# Patient Record
Sex: FEMALE | Race: WHITE | NOT HISPANIC OR LATINO | URBAN - METROPOLITAN AREA
[De-identification: names, ages, dates, MRNs, and addresses within clinical notes are randomized per-mention and may not be internally consistent; named-entity substitution may affect disease eponyms.]

---

## 2024-04-29 PROBLEM — Z00.00 ENCOUNTER FOR PREVENTIVE HEALTH EXAMINATION: Status: ACTIVE | Noted: 2024-04-29

## 2024-05-23 ENCOUNTER — OUTPATIENT (OUTPATIENT)
Dept: OUTPATIENT SERVICES | Facility: HOSPITAL | Age: 74
LOS: 1 days | End: 2024-05-23
Payer: MEDICARE

## 2024-05-23 ENCOUNTER — APPOINTMENT (OUTPATIENT)
Dept: NEUROLOGY | Facility: HOSPITAL | Age: 74
End: 2024-05-23
Payer: MEDICARE

## 2024-05-23 VITALS
HEIGHT: 64 IN | HEART RATE: 56 BPM | DIASTOLIC BLOOD PRESSURE: 78 MMHG | RESPIRATION RATE: 14 BRPM | SYSTOLIC BLOOD PRESSURE: 148 MMHG | OXYGEN SATURATION: 98 % | BODY MASS INDEX: 28.17 KG/M2 | WEIGHT: 165 LBS | TEMPERATURE: 97.2 F

## 2024-05-23 DIAGNOSIS — I69.141: ICD-10-CM

## 2024-05-23 DIAGNOSIS — Z82.49 FAMILY HISTORY OF ISCHEMIC HEART DISEASE AND OTHER DISEASES OF THE CIRCULATORY SYSTEM: ICD-10-CM

## 2024-05-23 DIAGNOSIS — Z83.438 FAMILY HISTORY OF OTHER DISORDER OF LIPOPROTEIN METABOLISM AND OTHER LIPIDEMIA: ICD-10-CM

## 2024-05-23 DIAGNOSIS — L89.310 PRESSURE ULCER OF RIGHT BUTTOCK, UNSTAGEABLE: ICD-10-CM

## 2024-05-23 DIAGNOSIS — Z86.73 PERSONAL HISTORY OF TRANSIENT ISCHEMIC ATTACK (TIA), AND CEREBRAL INFARCTION W/OUT RESIDUAL DEFICITS: ICD-10-CM

## 2024-05-23 PROCEDURE — 99205 OFFICE O/P NEW HI 60 MIN: CPT

## 2024-05-23 PROCEDURE — G0463: CPT

## 2024-05-23 RX ORDER — APIXABAN 5 MG/1
5 TABLET, FILM COATED ORAL
Refills: 0 | Status: ACTIVE | COMMUNITY

## 2024-05-23 RX ORDER — ATORVASTATIN CALCIUM 80 MG/1
80 TABLET, FILM COATED ORAL
Refills: 0 | Status: ACTIVE | COMMUNITY

## 2024-05-24 NOTE — PHYSICAL EXAM
[General Appearance - Alert] : alert [General Appearance - Well Nourished] : well nourished [Person] : oriented to person [Place] : oriented to place [Time] : oriented to time [Naming Objects] : no difficulty naming common objects [Cranial Nerves Optic (II)] : visual acuity intact bilaterally,  visual fields full to confrontation, pupils equal round and reactive to light [Cranial Nerves Oculomotor (III)] : extraocular motion intact [Cranial Nerves Trigeminal (V)] : facial sensation intact symmetrically [Cranial Nerves Facial (VII)] : face symmetrical [Cranial Nerves Vestibulocochlear (VIII)] : hearing was intact bilaterally [Cranial Nerves Glossopharyngeal (IX)] : tongue and palate midline [Cranial Nerves Accessory (XI - Cranial And Spinal)] : head turning and shoulder shrug symmetric [Cranial Nerves Hypoglossal (XII)] : there was no tongue deviation with protrusion [Motor Tone] : muscle tone was normal in all four extremities [Motor Handedness Right-Handed] : the patient is right hand dominant [Paresis Pronator Drift Right-Sided] : a right-sided pronator drift was present [Motor Strength Hips Right Weakness] : hip weakness was present [Motor Strength Hips Left Weakness] : hip weakness was present [3] : hip flexion 3/5 [+4] : knee extension +4/5 [5] : ankle plantar flexion 5/5 [Sensation Tactile Decrease] : light touch was intact [2+] : Ankle jerk left 2+ [Fluency] : fluency not intact [Motor Strength Shoulders Right Weakness] : normal shoulder strength [Motor Strength Upper Extremities Right] : normal arm strength [Hand Weakness Right] : normal hand  [Motor Strength Shoulders Left Weakness] : normal shoulder strength [Motor Strength Upper Extremities Left] : normal arm strength [Hand Weakness Left] : normal hand  [Plantar Reflex Right Only] : normal on the right [Plantar Reflex Left Only] : normal on the left [___] : absent on the right [___] : absent on the left
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
## 2024-05-24 NOTE — REVIEW OF SYSTEMS
[Memory Lapses or Loss] : memory loss [Difficulty with Language] : ~M difficulty with language [Arm Weakness] : arm weakness [Leg Weakness] : leg weakness [Inability to Walk] : inability to walk [Negative] : Constitutional [Facial Weakness] : no facial weakness [Abnormal Sensation] : no abnormal sensation

## 2024-05-24 NOTE — END OF VISIT
[] : Resident [FreeTextEntry3] : 74 yo woman with Afib, here to establish care.  She has motor aphasia (partial), mild right pronator drift.  We do not have record to review. Ask family or [facility] to fax us her record. continue eliquis, and statin unchange. stroke risk factor modifications.  to follow with PCP, cardiologist.  [Time Spent: ___ minutes] : I have spent [unfilled] minutes of time on the encounter.

## 2024-05-24 NOTE — DISCUSSION/SUMMARY
[FreeTextEntry1] : 73F with recent history of stroke unclear/hemorrhagic conversion of ischemic versus primary nontraumatic hemorrhagic, HTN, HLD and atrial fibillration (on apixaban) presenting to clinic to establish care.   IMPRESSION Expressive aphasia, R slight pronator drift secondary to stroke/hemorrhage on L sided of the brain   PLAN  CT head non con  Further stroke workup pending additional background information  Asked patient to fax health information from [facility] to establish timeline of patient's care  Continue Eliquis 5mg BID  Continue Atorvastatin 80mg Qhs  RTC 1 month   Seen at bedside with Dr. Steph Merrill

## 2024-05-29 DIAGNOSIS — Z86.73 PERSONAL HISTORY OF TRANSIENT ISCHEMIC ATTACK (TIA), AND CEREBRAL INFARCTION WITHOUT RESIDUAL DEFICITS: ICD-10-CM

## 2024-05-29 DIAGNOSIS — Z83.438 FAMILY HISTORY OF OTHER DISORDER OF LIPOPROTEIN METABOLISM AND OTHER LIPIDEMIA: ICD-10-CM

## 2024-05-29 DIAGNOSIS — Z82.49 FAMILY HISTORY OF ISCHEMIC HEART DISEASE AND OTHER DISEASES OF THE CIRCULATORY SYSTEM: ICD-10-CM

## 2024-06-20 ENCOUNTER — APPOINTMENT (OUTPATIENT)
Dept: NEUROLOGY | Facility: HOSPITAL | Age: 74
End: 2024-06-20

## 2024-06-20 DIAGNOSIS — I63.9 CEREBRAL INFARCTION, UNSPECIFIED: ICD-10-CM

## 2024-07-10 ENCOUNTER — APPOINTMENT (OUTPATIENT)
Dept: CT IMAGING | Facility: CLINIC | Age: 74
End: 2024-07-10
Payer: MEDICARE

## 2024-07-10 ENCOUNTER — OUTPATIENT (OUTPATIENT)
Dept: OUTPATIENT SERVICES | Facility: HOSPITAL | Age: 74
LOS: 1 days | End: 2024-07-10
Payer: MEDICARE

## 2024-07-10 DIAGNOSIS — I63.9 CEREBRAL INFARCTION, UNSPECIFIED: ICD-10-CM

## 2024-07-10 PROCEDURE — 70450 CT HEAD/BRAIN W/O DYE: CPT

## 2024-07-10 PROCEDURE — 70450 CT HEAD/BRAIN W/O DYE: CPT | Mod: 26,MH

## 2024-07-23 ENCOUNTER — APPOINTMENT (OUTPATIENT)
Dept: NEUROLOGY | Facility: HOSPITAL | Age: 74
End: 2024-07-23
Payer: MEDICARE

## 2024-07-23 VITALS
TEMPERATURE: 97 F | HEART RATE: 63 BPM | DIASTOLIC BLOOD PRESSURE: 82 MMHG | WEIGHT: 165 LBS | SYSTOLIC BLOOD PRESSURE: 136 MMHG | HEIGHT: 64 IN | BODY MASS INDEX: 28.17 KG/M2 | RESPIRATION RATE: 16 BRPM

## 2024-07-23 DIAGNOSIS — I63.9 CEREBRAL INFARCTION, UNSPECIFIED: ICD-10-CM

## 2024-07-23 PROCEDURE — 99212 OFFICE O/P EST SF 10 MIN: CPT

## 2024-07-23 NOTE — ASSESSMENT
[FreeTextEntry1] : 73F with recent history of stroke unclear/hemorrhagic conversion of ischemic versus primary nontraumatic hemorrhagic, HTN, HLD and atrial fibillration (on apixaban) presenting to clinic to establish care.  IMPRESSION Expressive aphasia, R slight pronator drift secondary to stroke/hemorrhage on L sided of the brain  PLAN CT head non con reviewed: old infarct PT/OT/psych referral provided Continue Eliquis 5mg BID Continue Atorvastatin 80mg Qhs RTC 6 months  Case discussed w/ Dr. Villeda.

## 2024-07-23 NOTE — END OF VISIT
[] : Resident [FreeTextEntry3] : Patient stable from last visit. Continues to take medications as prescribed. Family asking for more therapy, will send therapy referral. Additionally, family concerned that the patient's anxiety and depression is unchecked, will refer as well. [Time Spent: ___ minutes] : I have spent [unfilled] minutes of time on the encounter.

## 2024-07-23 NOTE — PHYSICAL EXAM
[FreeTextEntry1] : Orientation: oriented to person, oriented to place and oriented to time.   Language: Noted to have Broca's aphasia. fluency not intact, but no difficulty naming common objects.   Cranial Nerves: visual acuity intact bilaterally, visual fields full to confrontation, pupils equal round and reactive to light, extraocular motion intact, facial sensation intact symmetrically, face symmetrical, hearing was intact bilaterally, tongue and palate midline, head turning and shoulder shrug symmetric and there was no tongue deviation with protrusion.   Motor: muscle tone was normal in all four extremities.   Motor Strength:. the patient is right hand dominant. a right-sided pronator drift was present.  Right upper extremity: normal shoulder strength, normal arm strength, normal hand . Left upper extremity normal shoulder strength, normal arm strength, normal hand . Right lower extremity strength: hip weakness was present, hip flexion 3/5, knee flexion 3/5, knee extension 3/5, ankle dorsiflexion 4+/5, ankle plantar flexion 5/5. Left lower extremity strength: hip weakness was present, hip flexion 3/5, knee flexion +4/5, knee extension +4/5, ankle dorsiflexion 5/5, ankle plantar flexion 5/5.   Sensory exam: light touch was intact.   Deep tendon reflexes: Biceps right 2+. Biceps left 2+.  Triceps right 2+. Triceps left 2+.  Brachioradialis right 2+. Brachioradialis left 2+.  Patella right 2+. Patella left 2+.  Ankle jerk right 2+. Ankle jerk left 2+.   Plantar responses normal on the right, normal on the left.   Ankle Clonus absent on the right, absent on the left.
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
## 2024-08-16 RX ORDER — GUAIFENESIN 100 MG/5ML
10 LIQUID ORAL
Refills: 0 | DISCHARGE
Start: 2024-08-16

## 2024-08-16 RX ORDER — IPRATROPIUM BROMIDE AND ALBUTEROL SULFATE .5; 3 MG/3ML; MG/3ML
3 SOLUTION RESPIRATORY (INHALATION)
Refills: 0 | DISCHARGE
Start: 2024-08-16

## 2024-08-22 ENCOUNTER — INPATIENT (INPATIENT)
Facility: HOSPITAL | Age: 74
LOS: 4 days | Discharge: NURSING, REHAB OR LONG-TERM CARE | End: 2024-08-27
Attending: STUDENT IN AN ORGANIZED HEALTH CARE EDUCATION/TRAINING PROGRAM | Admitting: STUDENT IN AN ORGANIZED HEALTH CARE EDUCATION/TRAINING PROGRAM
Payer: MEDICARE

## 2024-08-22 VITALS
WEIGHT: 179.9 LBS | RESPIRATION RATE: 16 BRPM | TEMPERATURE: 99 F | HEIGHT: 64 IN | SYSTOLIC BLOOD PRESSURE: 143 MMHG | DIASTOLIC BLOOD PRESSURE: 81 MMHG | OXYGEN SATURATION: 96 % | HEART RATE: 85 BPM

## 2024-08-22 DIAGNOSIS — R79.89 OTHER SPECIFIED ABNORMAL FINDINGS OF BLOOD CHEMISTRY: ICD-10-CM

## 2024-08-22 DIAGNOSIS — R56.9 UNSPECIFIED CONVULSIONS: ICD-10-CM

## 2024-08-22 LAB
ALBUMIN SERPL ELPH-MCNC: 4 G/DL — SIGNIFICANT CHANGE UP (ref 3.3–5)
ALP SERPL-CCNC: 99 U/L — SIGNIFICANT CHANGE UP (ref 40–120)
ALT FLD-CCNC: 11 U/L — SIGNIFICANT CHANGE UP (ref 4–33)
ANION GAP SERPL CALC-SCNC: 13 MMOL/L — SIGNIFICANT CHANGE UP (ref 7–14)
AST SERPL-CCNC: 18 U/L — SIGNIFICANT CHANGE UP (ref 4–32)
BASOPHILS # BLD AUTO: 0.04 K/UL — SIGNIFICANT CHANGE UP (ref 0–0.2)
BASOPHILS NFR BLD AUTO: 0.4 % — SIGNIFICANT CHANGE UP (ref 0–2)
BILIRUB SERPL-MCNC: 0.3 MG/DL — SIGNIFICANT CHANGE UP (ref 0.2–1.2)
BUN SERPL-MCNC: 20 MG/DL — SIGNIFICANT CHANGE UP (ref 7–23)
CALCIUM SERPL-MCNC: 9.6 MG/DL — SIGNIFICANT CHANGE UP (ref 8.4–10.5)
CHLORIDE SERPL-SCNC: 103 MMOL/L — SIGNIFICANT CHANGE UP (ref 98–107)
CK MB BLD-MCNC: 6.3 % — HIGH (ref 0–2.5)
CK MB CFR SERPL CALC: 5.5 NG/ML — HIGH
CK SERPL-CCNC: 87 U/L — SIGNIFICANT CHANGE UP (ref 25–170)
CO2 SERPL-SCNC: 25 MMOL/L — SIGNIFICANT CHANGE UP (ref 22–31)
CREAT SERPL-MCNC: 0.77 MG/DL — SIGNIFICANT CHANGE UP (ref 0.5–1.3)
EGFR: 81 ML/MIN/1.73M2 — SIGNIFICANT CHANGE UP
EOSINOPHIL # BLD AUTO: 0.02 K/UL — SIGNIFICANT CHANGE UP (ref 0–0.5)
EOSINOPHIL NFR BLD AUTO: 0.2 % — SIGNIFICANT CHANGE UP (ref 0–6)
FLUAV AG NPH QL: SIGNIFICANT CHANGE UP
FLUBV AG NPH QL: SIGNIFICANT CHANGE UP
GLUCOSE SERPL-MCNC: 112 MG/DL — HIGH (ref 70–99)
HCT VFR BLD CALC: 35.7 % — SIGNIFICANT CHANGE UP (ref 34.5–45)
HGB BLD-MCNC: 11.6 G/DL — SIGNIFICANT CHANGE UP (ref 11.5–15.5)
IANC: 7.43 K/UL — HIGH (ref 1.8–7.4)
IMM GRANULOCYTES NFR BLD AUTO: 0.7 % — SIGNIFICANT CHANGE UP (ref 0–0.9)
LYMPHOCYTES # BLD AUTO: 1.73 K/UL — SIGNIFICANT CHANGE UP (ref 1–3.3)
LYMPHOCYTES # BLD AUTO: 18.1 % — SIGNIFICANT CHANGE UP (ref 13–44)
MAGNESIUM SERPL-MCNC: 2.3 MG/DL — SIGNIFICANT CHANGE UP (ref 1.6–2.6)
MCHC RBC-ENTMCNC: 29.5 PG — SIGNIFICANT CHANGE UP (ref 27–34)
MCHC RBC-ENTMCNC: 32.5 GM/DL — SIGNIFICANT CHANGE UP (ref 32–36)
MCV RBC AUTO: 90.8 FL — SIGNIFICANT CHANGE UP (ref 80–100)
MONOCYTES # BLD AUTO: 0.29 K/UL — SIGNIFICANT CHANGE UP (ref 0–0.9)
MONOCYTES NFR BLD AUTO: 3 % — SIGNIFICANT CHANGE UP (ref 2–14)
NEUTROPHILS # BLD AUTO: 7.43 K/UL — HIGH (ref 1.8–7.4)
NEUTROPHILS NFR BLD AUTO: 77.6 % — HIGH (ref 43–77)
NRBC # BLD: 0 /100 WBCS — SIGNIFICANT CHANGE UP (ref 0–0)
NRBC # FLD: 0 K/UL — SIGNIFICANT CHANGE UP (ref 0–0)
PHOSPHATE SERPL-MCNC: 3.7 MG/DL — SIGNIFICANT CHANGE UP (ref 2.5–4.5)
PLATELET # BLD AUTO: 382 K/UL — SIGNIFICANT CHANGE UP (ref 150–400)
POTASSIUM SERPL-MCNC: 4 MMOL/L — SIGNIFICANT CHANGE UP (ref 3.5–5.3)
POTASSIUM SERPL-SCNC: 4 MMOL/L — SIGNIFICANT CHANGE UP (ref 3.5–5.3)
PROT SERPL-MCNC: 8 G/DL — SIGNIFICANT CHANGE UP (ref 6–8.3)
RBC # BLD: 3.93 M/UL — SIGNIFICANT CHANGE UP (ref 3.8–5.2)
RBC # FLD: 13.5 % — SIGNIFICANT CHANGE UP (ref 10.3–14.5)
RSV RNA NPH QL NAA+NON-PROBE: SIGNIFICANT CHANGE UP
SARS-COV-2 RNA SPEC QL NAA+PROBE: SIGNIFICANT CHANGE UP
SODIUM SERPL-SCNC: 141 MMOL/L — SIGNIFICANT CHANGE UP (ref 135–145)
TROPONIN T, HIGH SENSITIVITY RESULT: 330 NG/L — CRITICAL HIGH
TROPONIN T, HIGH SENSITIVITY RESULT: 366 NG/L — CRITICAL HIGH
TSH SERPL-MCNC: 1.68 UIU/ML — SIGNIFICANT CHANGE UP (ref 0.27–4.2)
WBC # BLD: 9.58 K/UL — SIGNIFICANT CHANGE UP (ref 3.8–10.5)
WBC # FLD AUTO: 9.58 K/UL — SIGNIFICANT CHANGE UP (ref 3.8–10.5)

## 2024-08-22 PROCEDURE — 99291 CRITICAL CARE FIRST HOUR: CPT

## 2024-08-22 PROCEDURE — 70496 CT ANGIOGRAPHY HEAD: CPT | Mod: 26,MC

## 2024-08-22 PROCEDURE — 70498 CT ANGIOGRAPHY NECK: CPT | Mod: 26,MC

## 2024-08-22 PROCEDURE — 99223 1ST HOSP IP/OBS HIGH 75: CPT

## 2024-08-22 PROCEDURE — 71045 X-RAY EXAM CHEST 1 VIEW: CPT | Mod: 26

## 2024-08-22 PROCEDURE — 99222 1ST HOSP IP/OBS MODERATE 55: CPT | Mod: FS,GC

## 2024-08-22 RX ORDER — LEVETIRACETAM 1000 MG/1
2000 TABLET ORAL ONCE
Refills: 0 | Status: DISCONTINUED | OUTPATIENT
Start: 2024-08-22 | End: 2024-08-22

## 2024-08-22 RX ADMIN — LEVETIRACETAM 2000 MILLIGRAM(S): 1000 TABLET ORAL at 22:49

## 2024-08-22 NOTE — ED PROVIDER NOTE - OBJECTIVE STATEMENT
This is a 74 year old woman with past medical history of CVA (residual expressive aphasia and R sided lower extremity deficits) , HTN,  a fib on eliquis presenting today for possible seizure. Sister in the room provided most of the history. She was called by the nursing home for a presumed witnessed seizure at [place] around 11am. She had another seizure about an hour later which is when the decision to called EMS was made by the doctor. She had some confusion after each episode and tongue laceration. This is the first time this has happened. She did not have head trauma, but stayed seated in her wheelchair during the episodes. Each episode last about 1-2 minutes.  She is complaining of some left sided neck pain and headache. She denies recent illness/infection, surgery, no new medications. She denies any chest pain, shortness of breath, palpitations, abdominal pain.

## 2024-08-22 NOTE — CONSULT NOTE ADULT - ASSESSMENT
74y (1950) woman with a PMHx significant for CVA (residual expressive aphasia and R sided lower extremity deficits), HTN,  afib on eliquis presenting today for possible seizures. [facility] ED team [facility]. Presumed seizure occurred around 11am. She had another seizure about an hour later which is when the decision to called EMS. She had some confusion after each episode and b/l tongue laceration. This is the first time this has happened. She did not have head trauma, but stayed seated in her wheelchair during the episodes. Each episode last about 1-2 minutes.  She is complained of some left sided neck pain and headache. She denies recent illness/infection, surgery, no new medications. She reports her speech improved has regressed after the seizures. Neuro exam pertinent for expressive aphasia but still able to be understood, right hemiparesis worse in proximal right leg. CTH shows old left superior division MCA infarct, ex vacuo dilatation of the left frontal horn.     Impression: 2 transient seizure like episodes possible focal with secondary generalization due to chronic stroke    Plan  vEEG  keppra 2g load   start keppra 750mg bid in 6 hrs       Case discussed with Neurology Attending, Dr. Barnes 74y (1950) woman with a PMHx significant for CVA (residual expressive aphasia and R sided lower extremity deficits), HTN,  afib on eliquis presenting today for possible seizures. Per ED team sister spoke with [facility] staff who witnessed the events. Presumed seizure occurred around 11am. She had another seizure about an hour later which is when the decision to called EMS. She had some confusion after each episode and b/l tongue laceration. This is the first time this has happened. She did not have head trauma, but stayed seated in her wheelchair during the episodes. Each episode last about 1-2 minutes.  She is complained of some left sided neck pain and headache. She denies recent illness/infection, surgery, no new medications. She reports her speech improved has regressed after the seizures. Neuro exam pertinent for expressive aphasia but still able to be understood, right hemiparesis worse in proximal right leg. CTH shows old left superior division MCA infarct, ex vacuo dilatation of the left frontal horn.     Impression: 2 transient seizure like episodes possible focal with secondary generalization due to chronic stroke    Plan  vEEG  keppra 2g load   start keppra 750mg bid in 6 hrs   Admit to EMU      Case discussed with Neurology Attending, Dr. Barnes

## 2024-08-22 NOTE — H&P ADULT - NSHPSOCIALHISTORY_GEN_ALL_CORE
Currently at [facility] for long term care  Ambulatory with wheelchair  Denies tobacco, EtOH, or illicit substance use

## 2024-08-22 NOTE — H&P ADULT - NSHPLABSRESULTS_GEN_ALL_CORE
LABS and ADDITIONAL STUDIES:                        11.6   9.58  )-----------( 382      ( 22 Aug 2024 17:34 )             35.7     141  |  103  |  20  ----------------------------<  112<H>     08-22  4.0   |  25  |  0.77    Ca    9.6      22 Aug 2024 17:34  Phos  3.7     08-22  Mg     2.30     08-22    TPro  8.0  /  Alb  4.0  /  TBili  0.3  /  DBili  x   /  AST  18  /  ALT  11  /  AlkPhos  99  08-22    hs Troponin, T - 366 ng/L (08-22-24 @ 19:34)  hs Troponin, T - 330 ng/L (08-22-24 @ 17:34)  Creatine Kinase: 87 U/L (08.22.24 @ 19:34)  CKMB Units: 5.5 ng/mL/CPK Mass Assay %: 6.3 % (08.22.24 @ 19:34)     COVID/Flu/RSV - negative    < from: Xray Chest 1 View- PORTABLE-Urgent (08.22.24 @ 21:26) >    FINDINGS:    The heart size is normal in size.  The lungs are clear.  There is no pleural effusion. There is no pneumothorax.  No acute bony abnormality.    IMPRESSION:  Clear lungs.        ******PRELIMINARY REPORT******      < end of copied text > LABS and ADDITIONAL STUDIES:                        11.6   9.58  )-----------( 382      ( 22 Aug 2024 17:34 )             35.7     141  |  103  |  20  ----------------------------<  112<H>     08-22  4.0   |  25  |  0.77    Ca    9.6      22 Aug 2024 17:34  Phos  3.7     08-22  Mg     2.30     08-22    TPro  8.0  /  Alb  4.0  /  TBili  0.3  /  DBili  x   /  AST  18  /  ALT  11  /  AlkPhos  99  08-22    hs Troponin, T - 366 ng/L (08-22-24 @ 19:34)  hs Troponin, T - 330 ng/L (08-22-24 @ 17:34)  Creatine Kinase: 87 U/L (08.22.24 @ 19:34)  CKMB Units: 5.5 ng/mL/CPK Mass Assay %: 6.3 % (08.22.24 @ 19:34)     COVID/Flu/RSV - negative    < from: Xray Chest 1 View- PORTABLE-Urgent (08.22.24 @ 21:26) >  ******PRELIMINARY REPORT******    FINDINGS:  The heart size is normal in size.  The lungs are clear.  There is no pleural effusion. There is no pneumothorax.  No acute bony abnormality.    IMPRESSION:  Clear lungs.  < end of copied text >    < from: CT Angio Head w/ IV Cont (08.22.24 @ 19:33) >  FINDINGS:  Head CT: There is an old left superior division MCA infarct. Ex vacuo dilatation of the left frontal horn is seen.    There is no acute intracranial hemorrhage, mass effect, shift of midline. No herniation, hydrocephalus, abnormal extra-axial fluid collection.    Mucosal thickening and layering secretions are seen within the right sphenoid sinus. Otherwise, the paranasal sinuses and tympanomastoid cavities are clear. The calvarium appears intact. The orbits appear unremarkable.    CTA NECK: There is a standard anatomic configuration to the aortic arch. Atherosclerosis affects the arch and origins of the great vessels without associated stenosis.    The bilateral common carotid arteries appear unremarkable. Atherosclerotic plaque affects the bilateral carotid bulbs without significant stenosis. The cervical internal carotid arteries are patent extending to the skull base.    The origins of the bilateral vertebral arteries are normal. The bilateral vertebral arteries are patent.    CTA HEAD: Minor calcified plaques affect the bilateral carotid siphons without associated stenosis. Otherwise, the bilateral intracranial internal carotid, anterior, and middle cerebral arteries appear unremarkable.    The anterior and left posterior communicating arteries are seen. There is a persistent fetal origin of the right posterior cerebral artery. The right fetal PCA appears unremarkable.    The posterior circulation appears patent.    The intracranial venous structures appear unremarkable.    IMPRESSION:  Head CT:  1. Chronic left-sided superior division MCA territory infarct.  2. No acute intracranial hemorrhage.    CTA neck and head:  1. No large vessel occlusion or major stenosis.  --- End of Report ---  < end of copied text >    EKG - Sinus rhythm with 1st degree AV block and PACs, rate 81, , QTc 453, TWI V2, no significant ST-T wave changes, no prior EKG for comparison

## 2024-08-22 NOTE — H&P ADULT - PROBLEM SELECTOR PLAN 4
- c/w eliquis  - Monitor HR - Patient with recent URI with sore throat, nasal congestion, and cough, now improving  - Here no leukocytosis, no fever, Fluvid neg  - Monitor symptomatically for now

## 2024-08-22 NOTE — CONSULT NOTE ADULT - SUBJECTIVE AND OBJECTIVE BOX
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  Cardiology called on consult for elevated Troponin.  Patient with witnessed 2 episodes of seizure like activity lasting 1 minute each with post seizure lethargy and confusion with tongue laceration. Patient denies history of seizures.  Patient found to have elevated troponins with ECG showing ST depressions in anterior leads with Q Waves.  CKs negative.  Patient denies chest pain, SOB or diaphoresis.  she does not recall the name of her cardiologist and does not recall if she ever had a stress test or cardiac catheterization.  She deferred questions to her sister but sister ws not available by phone.  On assessment, patient A+OX3 with hesitant speech and stuttering due to expressive aphasia, SR 70s, /70, Temp 98.2F, SATing 97% on RA, lungs CTA, RR 14, no pedal edema, no JVD. Labs show no leukocytosis, no anemia, normal creatinine elevated troponin 330 uptrending to 366, CK 87 with CKMB 5.5.  CT Head chronic Left sided chronic MCA territory infarct.      Allergies    penicillin (Short breath)    Intolerances    	    MEDICATIONS:        levETIRAcetam   Injectable 2000 milliGRAM(s) IV Push once            PAST MEDICAL & SURGICAL HISTORY:      FAMILY HISTORY:      l    REVIEW OF SYSTEMS:    CONSTITUTIONAL: Seizures, aphasia, right sided lower extremity weakness  EYES/ENT: No visual changes;  No vertigo or throat pain   NECK: No pain or stiffness  RESPIRATORY: No cough, wheezing, hemoptysis; No shortness of breath  CARDIOVASCULAR: No chest pain or palpitations  GASTROINTESTINAL: No abdominal or epigastric pain. No nausea, vomiting, or hematemesis  GENITOURINARY: No dysuria, frequency or hematuria  NEUROLOGICAL: No numbness or weakness  SKIN: No itching, burning, rashes, or lesions   All other review of systems is negative unless indicated above.    PHYSICAL EXAM:  T(C): 36.8 (08-22-24 @ 16:18), Max: 37.2 (08-22-24 @ 15:13)  HR: 81 (08-22-24 @ 16:18) (81 - 85)  BP: 137/87 (08-22-24 @ 16:18) (137/87 - 143/81)  RR: 17 (08-22-24 @ 16:18) (16 - 17)  SpO2: 97% (08-22-24 @ 16:18) (96% - 97%)  Wt(kg): --  I&O's Summary      Appearance: expressive aphasia right sided lower extremity deficits  HEENT:  Normal oral mucosa, PERRL, EOMI	  Cardiovascular: Normal S1 S2, No JVD, No murmurs/rubs/gallops  Respiratory: Lungs clear to auscultation bilaterally  Gastrointestinal:  Soft, Non-tender, + BS	  Skin: No rashes, No ecchymoses, No cyanosis	  Neurologic: Non-focal  Extremities: No clubbing, cyanosis or edema  Vascular: Peripheral pulses palpable 2+ bilaterally  Psychiatry: A & O x 3 expressive aphasia    LABS:	 	    CBC Full  -  ( 22 Aug 2024 17:34 )  WBC Count : 9.58 K/uL  Hemoglobin : 11.6 g/dL  Hematocrit : 35.7 %  Platelet Count - Automated : 382 K/uL  Mean Cell Volume : 90.8 fL  Mean Cell Hemoglobin : 29.5 pg  Mean Cell Hemoglobin Concentration : 32.5 gm/dL  Auto Neutrophil # : 7.43 K/uL  Auto Lymphocyte # : 1.73 K/uL  Auto Monocyte # : 0.29 K/uL  Auto Eosinophil # : 0.02 K/uL  Auto Basophil # : 0.04 K/uL  Auto Neutrophil % : 77.6 %  Auto Lymphocyte % : 18.1 %  Auto Monocyte % : 3.0 %  Auto Eosinophil % : 0.2 %  Auto Basophil % : 0.4 %    08-22    141  |  103  |  20  ----------------------------<  112<H>  4.0   |  25  |  0.77    Ca    9.6      22 Aug 2024 17:34  Phos  3.7     08-22  Mg     2.30     08-22    TPro  8.0  /  Alb  4.0  /  TBili  0.3  /  DBili  x   /  AST  18  /  ALT  11  /  AlkPhos  99  08-22      proBNP:   Lipid Profile:   HgA1c:   TSH: Thyroid Stimulating Hormone, Serum: 1.68 uIU/mL (08-22 @ 17:34)        CARDIAC MARKERS:            TELEMETRY: 	    ECG:  	  RADIOLOGY:  < from: CT Angio Head w/ IV Cont (08.22.24 @ 19:33) >  ACC: [id number] EXAM:  CT ANGIO BRAIN (W)AW IC   ORDERED BY: JOSH TREVINO     ACC: [id number] EXAM:  CT ANGIO NECK (W)AW IC   ORDERED BY: JOSH TREVINO     PROCEDURE DATE:  08/22/2024          INTERPRETATION:  .    CLINICAL INFORMATION: New seizure. History of stroke.    TECHNIQUE: Transaxial CT images were acquired through the head without   the administration of IV contrast. Thin section CT angiography from the   aortic arch to the cranial vertex was also performed using a multislice   CT scanner,following the infusion of intravenous contrast material. 90   cc's of IV Omnipaque 350 was administered without immediate complication.   10 cc's was discarded. Sagittal and coronal MIP reformatted images were   obtained from the source data. Both the axial source and reconstructed   images were reviewed.    COMPARISON: Prior CT study of the head from 7/10/2024. No prior CT   angiogram studies of the head or neck are available for comparison.    FINDINGS:    Head CT: There is an old left superiordivision MCA infarct. Ex vacuo   dilatation of the left frontal horn is seen.    There is no acute intracranial hemorrhage, mass effect, shift of midline.   No herniation, hydrocephalus, abnormal extra-axial fluid collection.    Mucosal thickening and layering secretions are seen within the right   sphenoid sinus. Otherwise, the paranasal sinuses and tympanomastoid   cavities are clear. The calvarium appears intact. The orbits appear   unremarkable.    CTA NECK: There is a standard anatomic configuration to the aortic arch.   Atherosclerosis affects the arch and origins of the great vessels without   associated stenosis.    The bilateral common carotid arteries appear unremarkable.   Atherosclerotic plaque affects the bilateral carotid bulbs without   significant stenosis. The cervical internal carotid arteries are patent   extending to the skull base.    The origins of the bilateral vertebral arteries are normal. The bilateral   vertebral arteries are patent.    CTA HEAD: Minor calcifiedplaques affect the bilateral carotid siphons   without associated stenosis. Otherwise, the bilateral intracranial   internal carotid, anterior, and middle cerebral arteries appear   unremarkable.    The anterior and left posterior communicating arteries are seen. There is   a persistent fetal origin of the right posterior cerebral artery. The   right fetal PCA appears unremarkable.    The posterior circulation appears patent.    The intracranial venous structures appear unremarkable.    IMPRESSION:    Head CT:  1. Chronic left-sided superior division MCA territory infarct.  2. No acute intracranial hemorrhage.    CTA neck and head:  1. No large vessel occlusion or major stenosis.    --- End of Report ---            SOPHIA MICHAUD MD; Attending Radiologist  This document has been electronically signed. 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  Cardiology called on consult for elevated Troponin.  Patient with witnessed 2 episodes of seizure like activity lasting 1 minute each with post seizure lethargy and confusion with tongue laceration. Patient denies history of seizures.  Patient found to have elevated troponins with ECG showing T wave inversions in anterior leads with Q Waves.  CKs negative.  Patient denies chest pain, SOB or diaphoresis.  she does not recall the name of her cardiologist and does not recall if she ever had a stress test or cardiac catheterization.  She deferred questions to her sister but sister ws not available by phone.  On assessment, patient A+OX3 with hesitant speech and stuttering due to expressive aphasia, SR 70s, /70, Temp 98.2F, SATing 97% on RA, lungs CTA, RR 14, no pedal edema, no JVD. Labs show no leukocytosis, no anemia, normal creatinine elevated troponin 330 uptrending to 366, CK 87 with CKMB 5.5.  CT Head chronic Left sided chronic MCA territory infarct.      Allergies    penicillin (Short breath)    Intolerances    	    MEDICATIONS:        levETIRAcetam   Injectable 2000 milliGRAM(s) IV Push once            PAST MEDICAL & SURGICAL HISTORY:      FAMILY HISTORY:      l    REVIEW OF SYSTEMS:    CONSTITUTIONAL: Seizures, aphasia, right sided lower extremity weakness  EYES/ENT: No visual changes;  No vertigo or throat pain   NECK: No pain or stiffness  RESPIRATORY: No cough, wheezing, hemoptysis; No shortness of breath  CARDIOVASCULAR: No chest pain or palpitations  GASTROINTESTINAL: No abdominal or epigastric pain. No nausea, vomiting, or hematemesis  GENITOURINARY: No dysuria, frequency or hematuria  NEUROLOGICAL: No numbness or weakness  SKIN: No itching, burning, rashes, or lesions   All other review of systems is negative unless indicated above.    PHYSICAL EXAM:  T(C): 36.8 (08-22-24 @ 16:18), Max: 37.2 (08-22-24 @ 15:13)  HR: 81 (08-22-24 @ 16:18) (81 - 85)  BP: 137/87 (08-22-24 @ 16:18) (137/87 - 143/81)  RR: 17 (08-22-24 @ 16:18) (16 - 17)  SpO2: 97% (08-22-24 @ 16:18) (96% - 97%)  Wt(kg): --  I&O's Summary      Appearance: expressive aphasia right sided lower extremity deficits  HEENT:  Normal oral mucosa, PERRL, EOMI	  Cardiovascular: Normal S1 S2, No JVD, No murmurs/rubs/gallops  Respiratory: Lungs clear to auscultation bilaterally  Gastrointestinal:  Soft, Non-tender, + BS	  Skin: No rashes, No ecchymoses, No cyanosis	  Neurologic: Non-focal  Extremities: No clubbing, cyanosis or edema  Vascular: Peripheral pulses palpable 2+ bilaterally  Psychiatry: A & O x 3 expressive aphasia    LABS:	 	    CBC Full  -  ( 22 Aug 2024 17:34 )  WBC Count : 9.58 K/uL  Hemoglobin : 11.6 g/dL  Hematocrit : 35.7 %  Platelet Count - Automated : 382 K/uL  Mean Cell Volume : 90.8 fL  Mean Cell Hemoglobin : 29.5 pg  Mean Cell Hemoglobin Concentration : 32.5 gm/dL  Auto Neutrophil # : 7.43 K/uL  Auto Lymphocyte # : 1.73 K/uL  Auto Monocyte # : 0.29 K/uL  Auto Eosinophil # : 0.02 K/uL  Auto Basophil # : 0.04 K/uL  Auto Neutrophil % : 77.6 %  Auto Lymphocyte % : 18.1 %  Auto Monocyte % : 3.0 %  Auto Eosinophil % : 0.2 %  Auto Basophil % : 0.4 %    08-22    141  |  103  |  20  ----------------------------<  112<H>  4.0   |  25  |  0.77    Ca    9.6      22 Aug 2024 17:34  Phos  3.7     08-22  Mg     2.30     08-22    TPro  8.0  /  Alb  4.0  /  TBili  0.3  /  DBili  x   /  AST  18  /  ALT  11  /  AlkPhos  99  08-22      proBNP:   Lipid Profile:   HgA1c:   TSH: Thyroid Stimulating Hormone, Serum: 1.68 uIU/mL (08-22 @ 17:34)        CARDIAC MARKERS:            TELEMETRY: 	    ECG:  	  RADIOLOGY:  < from: CT Angio Head w/ IV Cont (08.22.24 @ 19:33) >  ACC: [id number] EXAM:  CT ANGIO BRAIN (W)AW IC   ORDERED BY: JOSH TREVINO     ACC: [id number] EXAM:  CT ANGIO NECK (W)AW IC   ORDERED BY: JOSH TREVINO     PROCEDURE DATE:  08/22/2024          INTERPRETATION:  .    CLINICAL INFORMATION: New seizure. History of stroke.    TECHNIQUE: Transaxial CT images were acquired through the head without   the administration of IV contrast. Thin section CT angiography from the   aortic arch to the cranial vertex was also performed using a multislice   CT scanner,following the infusion of intravenous contrast material. 90   cc's of IV Omnipaque 350 was administered without immediate complication.   10 cc's was discarded. Sagittal and coronal MIP reformatted images were   obtained from the source data. Both the axial source and reconstructed   images were reviewed.    COMPARISON: Prior CT study of the head from 7/10/2024. No prior CT   angiogram studies of the head or neck are available for comparison.    FINDINGS:    Head CT: There is an old left superiordivision MCA infarct. Ex vacuo   dilatation of the left frontal horn is seen.    There is no acute intracranial hemorrhage, mass effect, shift of midline.   No herniation, hydrocephalus, abnormal extra-axial fluid collection.    Mucosal thickening and layering secretions are seen within the right   sphenoid sinus. Otherwise, the paranasal sinuses and tympanomastoid   cavities are clear. The calvarium appears intact. The orbits appear   unremarkable.    CTA NECK: There is a standard anatomic configuration to the aortic arch.   Atherosclerosis affects the arch and origins of the great vessels without   associated stenosis.    The bilateral common carotid arteries appear unremarkable.   Atherosclerotic plaque affects the bilateral carotid bulbs without   significant stenosis. The cervical internal carotid arteries are patent   extending to the skull base.    The origins of the bilateral vertebral arteries are normal. The bilateral   vertebral arteries are patent.    CTA HEAD: Minor calcifiedplaques affect the bilateral carotid siphons   without associated stenosis. Otherwise, the bilateral intracranial   internal carotid, anterior, and middle cerebral arteries appear   unremarkable.    The anterior and left posterior communicating arteries are seen. There is   a persistent fetal origin of the right posterior cerebral artery. The   right fetal PCA appears unremarkable.    The posterior circulation appears patent.    The intracranial venous structures appear unremarkable.    IMPRESSION:    Head CT:  1. Chronic left-sided superior division MCA territory infarct.  2. No acute intracranial hemorrhage.    CTA neck and head:  1. No large vessel occlusion or major stenosis.    --- End of Report ---            SOPHIA MICHAUD MD; Attending Radiologist  This document has been electronically signed. Aug 22 2024  7:50PM    < end of copied text >    OTHER: 	    PREVIOUS DIAGNOSTIC TESTING:    [ ] Echocardiogram:  [ ] Catheterization:  [ ] Stress Test:

## 2024-08-22 NOTE — H&P ADULT - PROBLEM SELECTOR PLAN 2
- Patient with elevated troponin with uptrend on repeat but CK/CKMB not significant  - Patient reported having midsternal chest pain earlier in the day but none currently, no palpitations or dizziness/LOC currently  - EKG with TWI in V1-V2 but no significant findings of ischemia  - Spoke with cardiology, recommends to trend CE, labs ordered as per cards, repeat EKG in AM  - f/u cards reccs in AM - Patient with elevated troponin with uptrend on repeat but CK/CKMB not significant  - Patient reported having midsternal chest pain earlier in the day but none currently, no palpitations or dizziness/LOC currently  - EKG with TWI in V1-V2 but no significant findings of ischemia  - Spoke with cardiology, recommends to trend CE, labs ordered as per cards, repeat EKG in AM, TTE ordered  - f/u cards reccs in AM  - Of note, patient noted to have a CAYDEN loudest at the LUSB, she is not sure if she has had a murmur in the past or not, said her sister would know better -> f/u TTE, clarify with sister in AM

## 2024-08-22 NOTE — H&P ADULT - PROBLEM SELECTOR PLAN 9
DVT ppx - On eliquis  Diet - Pending dysphagia screen, if passes then likely soft and bite sized pending dysphagia screen  Activity - OOB with assistance, increase as tolerated    Fall and aspiration precautions

## 2024-08-22 NOTE — ED PROVIDER NOTE - PROGRESS NOTE DETAILS
URIEL:  Cardiology consulted for elevated troponin.  To see patient. [place] PGY3:  Spoke with Neurology who will come and see the Pt. They recommend ordering video EEG. Spoke with Neurology who want to load the Pt with 2g keppra and admit to Dr. Barnes for video EEG.

## 2024-08-22 NOTE — CONSULT NOTE ADULT - SUBJECTIVE AND OBJECTIVE BOX
Neurology - Consult Note    -  Spectra: [phone] ([facility]), [phone] ([facility])  -    HPI: Patient LAILA JARVIS is a 74y (1950) woman with a PMHx significant for CVA (residual expressive aphasia and R sided lower extremity deficits) , HTN,  a fib on eliquis presenting today for possible seizure. Sister in the room provided most of the history. She was called by the nursing home for a presumed witnessed seizure at [place] around 11am. She had another seizure about an hour later which is when the decision to called EMS was made by the doctor. She had some confusion after each episode and tongue laceration. This is the first time this has happened. She did not have head trauma, but stayed seated in her wheelchair during the episodes. Each episode last about 1-2 minutes.  She is complaining of some left sided neck pain and headache. She denies recent illness/infection, surgery, no new medications.    ED vitals 143/81, HR 81, afebrile, O2 96%  on RA    Review of Systems: refer to HPI     Allergies: penicillin (Short breath)    PMHx/PSHx/Family Hx: As above, otherwise see below     Social Hx: No current use of tobacco, alcohol, or illicit drugs    Medications:  MEDICATIONS  (STANDING):  MEDICATIONS  (PRN):    Vitals:  T(C): 36.8 (08-22-24 @ 16:18), Max: 37.2 (08-22-24 @ 15:13)  HR: 81 (08-22-24 @ 16:18) (81 - 85)  BP: 137/87 (08-22-24 @ 16:18) (137/87 - 143/81)  RR: 17 (08-22-24 @ 16:18) (16 - 17)  SpO2: 97% (08-22-24 @ 16:18) (96% - 97%)    Physical Examination: INCOMPLETE  General - NAD    Neurologic Exam:  Mental status - Awake, Alert, Oriented to person, place, and time. Speech fluent, repetition and naming intact. Follows simple and complex commands. Attention/concentration, recent and remote memory (including registration and recall), and fund of knowledge intact    Cranial nerves - PERRLA, VFF, EOMI, face sensation (V1-V3) intact b/l, facial strength intact without asymmetry b/l, hearing intact b/l, palate with symmetric elevation, trapezius OR sternocleidomastiod 5/5 strength b/l, tongue midline on protrusion with full lateral movement    Motor - Normal bulk and tone throughout. No pronator drift.  Strength testing            Deltoid      Biceps      Triceps     Wrist Extension    Wrist Flexion     Interossei         R            5                 5               5                     5                              5                        5                 5  L             5                 5               5                     5                              5                        5                 5              Hip Flexion    Hip Extension    Knee Flexion    Knee Extension    Dorsiflexion    Plantar Flexion  R              5                           5                       5                           5                            5                          5  L              5                           5                        5                           5                            5                          5    Sensation - Light touch/temperature OR pain/vibration intact throughout    DTR's -             Biceps      Triceps     Brachioradialis      Patellar    Ankle    Toes/plantar response  R             2+             2+                  2+                       2+            2+                 Down  L              2+             2+                 2+                        2+           2+                 Down    Coordination - Finger to Nose intact b/l. No tremors appreciated    Gait and station - Normal casual gait. Romberg (-)    Labs:                        11.6   9.58  )-----------( 382      ( 22 Aug 2024 17:34 )             35.7     08-22    141  |  103  |  20  ----------------------------<  112<H>  4.0   |  25  |  0.77    Ca    9.6      22 Aug 2024 17:34  Phos  3.7     08-22  Mg     2.30     08-22    TPro  8.0  /  Alb  4.0  /  TBili  0.3  /  DBili  x   /  AST  18  /  ALT  11  /  AlkPhos  99  08-22    CAPILLARY BLOOD GLUCOSE      POCT Blood Glucose.: 131 mg/dL (22 Aug 2024 15:25)    LIVER FUNCTIONS - ( 22 Aug 2024 17:34 )  Alb: 4.0 g/dL / Pro: 8.0 g/dL / ALK PHOS: 99 U/L / ALT: 11 U/L / AST: 18 U/L / GGT: x             Radiology:  Head CT: There is an old left superior division MCA infarct. Ex vacuo dilatation of the left frontal horn is seen.  There is no acute intracranial hemorrhage, mass effect, shift of midline. No herniation, hydrocephalus, abnormal extra-axial fluid collection.    CTA NECK: There is a standard anatomic configuration to the aortic arch. Atherosclerosis affects the arch and origins of the great vessels without associated stenosis.  The bilateral common carotid arteries appear unremarkable.   Atherosclerotic plaque affects the bilateral carotid bulbs without significant stenosis.   The cervical internal carotid arteries are patent extending to the skull base.  The origins of the bilateral vertebral arteries are normal.  The bilateral vertebral arteries are patent.    CTA HEAD: Minor calcified plaques affect the bilateral carotid siphons without associated stenosis.   Otherwise, the bilateral intracranial internal carotid, anterior, and middle cerebral arteries appear unremarkable.  The anterior and left posterior communicating arteries are seen. T  here is a persistent fetal origin of the right posterior cerebral artery. The right fetal PCA appears unremarkable.  The posterior circulation appears patent.  The intracranial venous structures appear unremarkable.    IMPRESSION:  Head CT:  1. Chronic left-sided superior division MCA territory infarct.  2. No acute intracranial hemorrhage.  CTA neck and head:  1. No large vessel occlusion or major stenosis.     Neurology - Consult Note    -  Spectra: [phone] ([facility]), [phone] ([facility])  -    HPI: Patient LAILA JARVIS is a 74y (1950) woman with a PMHx significant for CVA (residual expressive aphasia and R sided lower extremity deficits), HTN,  afib on eliquis presenting today for possible seizures. [facility] ED [facility]. Presumed seizure occurred around 11am. She had another seizure about an hour later which is when the decision to called EMS. She had some confusion after each episode and b/l tongue laceration. This is the first time this has happened. She did not have head trauma, but stayed seated in her wheelchair during the episodes. Each episode last about 1-2 minutes.  She is complained of some left sided neck pain and headache. She denies recent illness/infection, surgery, no new medications. She reports her speech improved has regressed after the seizures.     ED vitals 143/81, HR 81, afebrile, O2 96%  on RA    Review of Systems: refer to HPI     Allergies: penicillin (Short breath)    PMHx/PSHx/Family Hx: As above, otherwise see below     Social Hx: No current use of tobacco, alcohol, or illicit drugs    Medications:  MEDICATIONS  (STANDING):  MEDICATIONS  (PRN):    Vitals:  T(C): 36.8 (08-22-24 @ 16:18), Max: 37.2 (08-22-24 @ 15:13)  HR: 81 (08-22-24 @ 16:18) (81 - 85)  BP: 137/87 (08-22-24 @ 16:18) (137/87 - 143/81)  RR: 17 (08-22-24 @ 16:18) (16 - 17)  SpO2: 97% (08-22-24 @ 16:18) (96% - 97%)    Physical Examination:  General - NAD    Neurologic Exam:  Mental status - Awake, Alert, Oriented to self, location (hospital), time (august 2024)  Speech is not fluent, she can repeat and intermittently identify objects  Follows simple  commands.    Cranial nerves - PERRLA, VFF, EOMI, face sensation (V1-V3) intact b/l, facial strength intact without asymmetry b/l, hearing intact b/l, palate with symmetric elevation, trapezius 5/5 strength b/l, tongue midline on protrusion with full lateral movement    Motor - Normal bulk and tone throughout. No pronator drift.  Strength testing            Deltoid      Biceps      Triceps          R            5                 5               5                     5                                L             5                 5               5                     4+                                       Hip Flexion       Knee Flexion    Knee Extension    Dorsiflexion    Plantar Flexion  R              3                           4-                       4-                      4+                            4+                           L              5                           5                        5                           5                            5                              Sensation - Light touch intact throughout    DTR's -             Biceps      Triceps     Brachioradialis      Patellar    Ankle    Toes/plantar response  R             2+             2+                  2+                       2+            2+                 mute  L              2+             2+                 2+                        2+           2+                 mute    Coordination - Finger to Nose intact b/l. No tremors appreciated    Gait and station - deferred due to weakness    Labs:                        11.6   9.58  )-----------( 382      ( 22 Aug 2024 17:34 )             35.7     08-22    141  |  103  |  20  ----------------------------<  112<H>  4.0   |  25  |  0.77    Ca    9.6      22 Aug 2024 17:34  Phos  3.7     08-22  Mg     2.30     08-22    TPro  8.0  /  Alb  4.0  /  TBili  0.3  /  DBili  x   /  AST  18  /  ALT  11  /  AlkPhos  99  08-22    CAPILLARY BLOOD GLUCOSE      POCT Blood Glucose.: 131 mg/dL (22 Aug 2024 15:25)    LIVER FUNCTIONS - ( 22 Aug 2024 17:34 )  Alb: 4.0 g/dL / Pro: 8.0 g/dL / ALK PHOS: 99 U/L / ALT: 11 U/L / AST: 18 U/L / GGT: x             Radiology:  Head CT: There is an old left superior division MCA infarct. Ex vacuo dilatation of the left frontal horn is seen.  There is no acute intracranial hemorrhage, mass effect, shift of midline. No herniation, hydrocephalus, abnormal extra-axial fluid collection.    CTA NECK: There is a standard anatomic configuration to the aortic arch. Atherosclerosis affects the arch and origins of the great vessels without associated stenosis.  The bilateral common carotid arteries appear unremarkable.   Atherosclerotic plaque affects the bilateral carotid bulbs without significant stenosis.   The cervical internal carotid arteries are patent extending to the skull base.  The origins of the bilateral vertebral arteries are normal.  The bilateral vertebral arteries are patent.    CTA HEAD: Minor calcified plaques affect the bilateral carotid siphons without associated stenosis.   Otherwise, the bilateral intracranial internal carotid, anterior, and middle cerebral arteries appear unremarkable.  The anterior and left posterior communicating arteries are seen. T  here is a persistent fetal origin of the right posterior cerebral artery. The right fetal PCA appears unremarkable.  The posterior circulation appears patent.  The intracranial venous structures appear unremarkable.    IMPRESSION:  Head CT:  1. Chronic left-sided superior division MCA territory infarct.  2. No acute intracranial hemorrhage.  CTA neck and head:  1. No large vessel occlusion or major stenosis.     Neurology - Consult Note    -  Spectra: [phone] ([facility]), [phone] ([facility])  -    HPI: Patient LAILA JARVIS is a 74y (1950) woman with a PMHx significant for CVA (residual expressive aphasia and R sided lower extremity deficits), HTN,  afib on eliquis presenting today for possible seizures. [facility]. Presumed seizure occurred around 11am. She had another seizure about an hour later which is when the decision to called EMS. She had some confusion after each episode and b/l tongue laceration. This is the first time this has happened. She did not have head trauma, but stayed seated in her wheelchair during the episodes. Each episode last about 1-2 minutes.  She is complained of some left sided neck pain and headache. She denies recent illness/infection, surgery, no new medications. She reports her speech improved has regressed after the seizures.     ED vitals 143/81, HR 81, afebrile, O2 96%  on RA    Review of Systems: refer to HPI     Allergies: penicillin (Short breath)    PMHx/PSHx/Family Hx: As above, otherwise see below     Social Hx: No current use of tobacco, alcohol, or illicit drugs    Medications:  MEDICATIONS  (STANDING):  MEDICATIONS  (PRN):    Vitals:  T(C): 36.8 (08-22-24 @ 16:18), Max: 37.2 (08-22-24 @ 15:13)  HR: 81 (08-22-24 @ 16:18) (81 - 85)  BP: 137/87 (08-22-24 @ 16:18) (137/87 - 143/81)  RR: 17 (08-22-24 @ 16:18) (16 - 17)  SpO2: 97% (08-22-24 @ 16:18) (96% - 97%)    Physical Examination:  General - NAD    Neurologic Exam:  Mental status - Awake, Alert, Oriented to self, location (hospital), time (august 2024)  Speech is not fluent, she can repeat and intermittently identify objects  Follows simple  commands.    Cranial nerves - PERRLA, VFF, EOMI, face sensation (V1-V3) intact b/l, facial strength intact without asymmetry b/l, hearing intact b/l, palate with symmetric elevation, trapezius 5/5 strength b/l, tongue midline on protrusion with full lateral movement    Motor - Normal bulk and tone throughout. No pronator drift.  Strength testing            Deltoid      Biceps      Triceps          R            5                 5               5                     4+                                L             5                 5               5                     5                                       Hip Flexion       Knee Flexion    Knee Extension    Dorsiflexion    Plantar Flexion  R              3                           4-                       4-                      4+                            4+                           L              5                           5                        5                           5                            5                              Sensation - Light touch intact throughout    DTR's -             Biceps      Triceps     Brachioradialis      Patellar    Ankle    Toes/plantar response  R             2+             2+                  2+                       2+            2+                 mute  L              2+             2+                 2+                        2+           2+                 mute    Coordination - Finger to Nose intact b/l. No tremors appreciated    Gait and station - deferred due to weakness    Labs:                        11.6   9.58  )-----------( 382      ( 22 Aug 2024 17:34 )             35.7     08-22    141  |  103  |  20  ----------------------------<  112<H>  4.0   |  25  |  0.77    Ca    9.6      22 Aug 2024 17:34  Phos  3.7     08-22  Mg     2.30     08-22    TPro  8.0  /  Alb  4.0  /  TBili  0.3  /  DBili  x   /  AST  18  /  ALT  11  /  AlkPhos  99  08-22    CAPILLARY BLOOD GLUCOSE      POCT Blood Glucose.: 131 mg/dL (22 Aug 2024 15:25)    LIVER FUNCTIONS - ( 22 Aug 2024 17:34 )  Alb: 4.0 g/dL / Pro: 8.0 g/dL / ALK PHOS: 99 U/L / ALT: 11 U/L / AST: 18 U/L / GGT: x             Radiology:  Head CT: There is an old left superior division MCA infarct. Ex vacuo dilatation of the left frontal horn is seen.  There is no acute intracranial hemorrhage, mass effect, shift of midline. No herniation, hydrocephalus, abnormal extra-axial fluid collection.    CTA NECK: There is a standard anatomic configuration to the aortic arch. Atherosclerosis affects the arch and origins of the great vessels without associated stenosis.  The bilateral common carotid arteries appear unremarkable.   Atherosclerotic plaque affects the bilateral carotid bulbs without significant stenosis.   The cervical internal carotid arteries are patent extending to the skull base.  The origins of the bilateral vertebral arteries are normal.  The bilateral vertebral arteries are patent.    CTA HEAD: Minor calcified plaques affect the bilateral carotid siphons without associated stenosis.   Otherwise, the bilateral intracranial internal carotid, anterior, and middle cerebral arteries appear unremarkable.  The anterior and left posterior communicating arteries are seen. T  here is a persistent fetal origin of the right posterior cerebral artery. The right fetal PCA appears unremarkable.  The posterior circulation appears patent.  The intracranial venous structures appear unremarkable.    IMPRESSION:  Head CT:  1. Chronic left-sided superior division MCA territory infarct.  2. No acute intracranial hemorrhage.  CTA neck and head:  1. No large vessel occlusion or major stenosis.

## 2024-08-22 NOTE — H&P ADULT - NSICDXPASTMEDICALHX_GEN_ALL_CORE_FT
Cc:  Patient presents today for a FBS right eye.  Patient states over the last month has noticed a FBS in the right eye only.  The last few days has used \"refresh\" and now she no longer feels anything; just making sure nothing is in the eye\"      POH:  Pseudo right, Cataracts left, Choroidal nevus right, Ocular hypertension; both    DROPS:  Refresh PRN    MEDICATIONS AND ALLERGIES REVIEWED  TOBACCO USE REVIEWED  Denies known Latex allergy or symptoms of Latex sensitivity.    PCP Ori Saba MD    
S: Above noted.  --PT CALLED TO COME IN EARLY**    MILD FBS OD X 1 MO  --tried Refresh Tears late last wk x 2 days: MUCH BETTER**  --JUST WANTED OD CHECK**    OS OK    No (other) associated signs/symptoms.    PAST MEDICAL HISTORY/PROBLEMS/PAST SURGICAL HISTORY/FAMILY HISTORY/SOCIAL HISTORY: reviewed  POH: NOTED   Past surgical history  07/1999    brain surgery for aneurysm, rt posterior cerebral artery,  Dr. Stevenson,   [facility]   • Radial keratotomy Bilateral 1995     ? In [place]       Extracapsular cataract removal w insert io lens prosth wo ecp Right 01/04/2022    ZCB00 +22.5D Dr. Sherman Negron       GTTS:   --Refresh PRN      SLE :       L/L/L:  OD: nl and trace decr TF //OS: nl and trace decr TF       CONJ:  OD: clear  //OS: clear     --WHITE OU**       CORNEA:  OD: clear  //OS: clear       AC:  OD: d,q  // OS: d,q       IRIS:  OD: clear  //OS: clear       LENS:  OD: PC IOL centered  //OS: 1-2+ NS    A:  See diagnosis below; tech note reviewed and accepted.    P:  See orders/disposition.    COMMENT: observation reccommended, pt concerned...reassured, conditon(s) improving and questions answered    Recommend trial artificial tears 1-4X/D OU prn (Refresh**, [place], other)        
PAST MEDICAL HISTORY:  Essential hypertension     History of CVA (cerebrovascular accident)     HLD (hyperlipidemia)     OA (osteoarthritis)     Paroxysmal atrial fibrillation     Polyneuropathy

## 2024-08-22 NOTE — H&P ADULT - PROBLEM SELECTOR PLAN 3
- Patient with recent URI with sore throat, nasal congestion, and cough, now improving  - Here no leukocytosis, no fever, Fluvid neg  - Monitor symptomatically for now - Patient with R leg non-pitting edema, does have pain in b/l legs but also with ?polyneuropathy on gabapentin  - No erythema, no warmth  - Pt reports having chronic RLE edema  - Check US duplex venous b/l LE

## 2024-08-22 NOTE — H&P ADULT - NSHPREVIEWOFSYSTEMS_GEN_ALL_CORE
REVIEW OF SYSTEMS:    CONSTITUTIONAL: No weakness, fevers or chills  EYES: No visual changes or eye discharge  ENT: +rhinorrhea/sore throat improving  NECK: No pain or stiffness  RESPIRATORY: +cough with mild sputum, No wheezing, No hemoptysis; No shortness of breath  CARDIOVASCULAR: No chest pain or palpitations; No lower extremity edema  GASTROINTESTINAL: No abdominal or epigastric pain. No nausea, vomiting, or hematemesis; No diarrhea or constipation. No melena or hematochezia.  BACK: No back pain  GENITOURINARY: No dysuria, frequency or hematuria  NEUROLOGICAL: +Expressive aphasia 2/2 aphasia, +R sided weakness 2/2 CVA, No numbness or weakness  SKIN: No itching, burning, rashes, or lesions

## 2024-08-22 NOTE — ED ADULT NURSE NOTE - OBJECTIVE STATEMENT
Pt received to rm 4  , awake and alert, A&OX4, bedbound.  Pt coming in from [facility] for new onset of seizures. Per staff, pt had two seizures today lasting about 1 minute long. , c/o L sided chest pain that just start this morning. pt denies any recent fever or chills. pt placed on cardiac monitor. NSR.  Respirations even and unlabored. Denies CP, SOB, N/V, HA, dizziness, palpitations, blurry vision. 20G IV placed to right AC  . Bed in lowest position, call bell within reach. Safety maintained.

## 2024-08-22 NOTE — H&P ADULT - PROBLEM SELECTOR PLAN 5
- Not on anti-platelets currently -> clarify with neurology in AM to see if she should be started on a baby aspirin  - c/w statin therapy  - PT eval and speech eval ordered - c/w eliquis  - Monitor HR

## 2024-08-22 NOTE — H&P ADULT - NSHPPHYSICALEXAM_GEN_ALL_CORE
Vital Signs Last 24 Hrs  T(C): 36.5 (23 Aug 2024 01:10), Max: 37.2 (22 Aug 2024 15:13)  T(F): 97.7 (23 Aug 2024 01:10), Max: 98.9 (22 Aug 2024 15:13)  HR: 78 (23 Aug 2024 01:10) (78 - 85)  BP: 123/83 (23 Aug 2024 01:10) (123/83 - 143/81)  BP(mean): --  RR: 20 (23 Aug 2024 01:10) (16 - 20)  SpO2: 97% (23 Aug 2024 01:10) (96% - 100%)    Parameters below as of 23 Aug 2024 01:10  Patient On (Oxygen Delivery Method): room air    GENERAL: No acute distress, well-developed  EYES/ENT: EOMI, PERRL, conjunctiva and sclera clear, Neck supple, No JVD, moist mucosa, +tongue bites on b/l sides of the tongue  CHEST/LUNG: Clear to auscultation bilaterally; No wheeze, equal breath sounds bilaterally   BACK: No spinal tenderness  HEART: Regular rate and rhythm; No murmurs, rubs, or gallops  ABDOMEN: Soft, Nontender, Nondistended; Bowel sounds present  EXTREMITIES: +DP/PT/radial pulses b/, +non-pitting edema of the RLE (chronic as per patient), +TTP of b/l legs  PSYCH: Nl behavior, nl affect  NEUROLOGY: AAOx3; +Expressive aphasia, tongue midline, face symmetric, V1-V3 SILT b/l, EOMI/PERRL, shoulder shrug intact b/l; SILT b/l; b/l UE 5/5 strength, LLE 5/5 strength at hip/knee/ankle, RLE 3/5 strength at hip 4/5 strength at knee 5/5 strength at ankle  SKIN: Normal color, No rashes or lesions Vital Signs Last 24 Hrs  T(C): 36.5 (23 Aug 2024 01:10), Max: 37.2 (22 Aug 2024 15:13)  T(F): 97.7 (23 Aug 2024 01:10), Max: 98.9 (22 Aug 2024 15:13)  HR: 78 (23 Aug 2024 01:10) (78 - 85)  BP: 123/83 (23 Aug 2024 01:10) (123/83 - 143/81)  BP(mean): --  RR: 20 (23 Aug 2024 01:10) (16 - 20)  SpO2: 97% (23 Aug 2024 01:10) (96% - 100%)    Parameters below as of 23 Aug 2024 01:10  Patient On (Oxygen Delivery Method): room air    GENERAL: No acute distress, well-developed  EYES/ENT: EOMI, PERRL, conjunctiva and sclera clear, Neck supple, No JVD, moist mucosa, +tongue bites on b/l sides of the tongue  CHEST/LUNG: Clear to auscultation bilaterally; No wheeze, equal breath sounds bilaterally   BACK: No spinal tenderness  HEART: Regular rate and rhythm; +CAYDEN  ABDOMEN: Soft, Nontender, Nondistended; Bowel sounds present  EXTREMITIES: +DP/PT/radial pulses b/, +non-pitting edema of the RLE (chronic as per patient), +TTP of b/l legs  PSYCH: Nl behavior, nl affect  NEUROLOGY: AAOx3; +Expressive aphasia, tongue midline, face symmetric, V1-V3 SILT b/l, EOMI/PERRL, shoulder shrug intact b/l; SILT b/l; b/l UE 5/5 strength, LLE 5/5 strength at hip/knee/ankle, RLE 3/5 strength at hip 4/5 strength at knee 5/5 strength at ankle  SKIN: Normal color, No rashes or lesions

## 2024-08-22 NOTE — H&P ADULT - PROBLEM SELECTOR PLAN 1
- Patient with new onset, possibly in setting of her chronic stroke  - Neurology eval appreciated -> check vEEG, keppra 750mg BID (timed for 6 hours after her initial load)  - Seizure precautions, Fall and aspiration precautions  - Dysphagia screen, S&S eval  - f/u further neurology reccs in AM

## 2024-08-22 NOTE — H&P ADULT - PROBLEM SELECTOR PLAN 6
- Not on meds, monitor BP - Not on anti-platelets currently -> clarify with neurology in AM to see if she should be started on a baby aspirin  - c/w statin therapy  - PT eval and speech eval ordered

## 2024-08-22 NOTE — CONSULT NOTE ADULT - NS ATTEND AMEND GEN_ALL_CORE FT
The patient was seen and examined with the Cardiology Consultation Teaching Service.     74-year-old woman with prior stroke and atrial fibrillation who presented from [facility] after two witnessed episodes of seizure-like activity noted to have elevated hs-troponin and non-specific changes on ECG.    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  Paroxysmal Atrial fibrillation on apixaban  Hypertension    Comfortable-appearing woman in no acute distress  Alert and oriented  Afebrile  Vital signs stable  JVP is not elevated  Clear lungs  Normal heart sounds  Extremities are warm and perfused  No peripheral edema     Normal blood counts  GFR 81    hs-troponin 178, 366, 330  CK-MB 5.5  pro-    , TG 82, HDL 34, NHDL 120,   HbA1c 5.3    ECG demonstrates sinus rhythm with first degree AV delay, borderline low voltage, possible septal Q waves, poor R wave progression, no significant ST-segment deviation or T wave changes    CXR demonstrates clear lungs    Impression and Recommendations:   74-year-old woman with prior stroke and atrial fibrillation who presented from [facility] after two witnessed episodes of seizure-like activity noted to have elevated hs-troponin and non-specific changes on ECG.    The patient has no chest pain or symptoms suggestive of angina. The patient's ECG is also without signs of myocardial ischemia or injury. I do not believe the patient's elevated hs-troponin is indicative of acute coronary syndrome or myocardial infarction, and as such, I would not initiate ACS treatment at this time. I suspect this is a non-specific finding in an elderly patient in the setting of an acute seizure.     Echocardiography would be reasonable to obtain out of an abundance of caution. I suspect given her stroke and atrial fibrillation history she has undergone echocardiography previously, but none are seen in our EMR.    Continue apixaban for stroke prevention with paroxysmal atrial fibrillation.  Continue atorvastatin given the patient's history of stroke.     Alejandro Calderon MD FACC FACP  Cardiology  [phone]

## 2024-08-22 NOTE — H&P ADULT - ASSESSMENT
This is a 74F with history of CVA with R sided weakness and expressive aphasia, pAFib, HTN, HLD, CAD, and OA presents to the hospital with new onset seizures x2.

## 2024-08-22 NOTE — H&P ADULT - PROBLEM SELECTOR PLAN 8
DVT ppx - On eliquis  Diet - Pending dysphagia screen, if passes then likely soft and bite sized pending dysphagia screen  Activity - OOB with assistance, increase as tolerated    Fall and aspiration precautions - c/w atorvastatin

## 2024-08-22 NOTE — ED PROVIDER NOTE - PHYSICAL EXAMINATION
GENERAL: Not in acute distress, non-toxic appearing  HEAD: normocephalic, atraumatic  HEENT: + bite marks to the tongue, PERRLA, EOMI, normal conjunctiva, oral mucosa moist, neck supple  CARDIAC: regular rate and rhythm, normal S1 and S2,  no appreciable murmurs  PULM: clear to ascultation bilaterally, no crackles, rales, rhonchi, or wheezing  GI: abdomen nondistended, soft, nontender, no guarding or rebound tenderness  NEURO: + aphasia (difficulty with word finding), + mild decreased sensation and strength to right arm and leg however Pt is able to moves both arms and legs.  alert and oriented x 3, normal speech, no gross neurologic deficit  MSK: No visible deformities, no peripheral edema, calf tenderness/redness/swelling  SKIN: No visible rashes, dry, well-perfused  PSYCH: appropriate mood and affect

## 2024-08-22 NOTE — ED PROVIDER NOTE - CLINICAL SUMMARY MEDICAL DECISION MAKING FREE TEXT BOX
This is a 74 year old woman with past medical history of CVA (residual expressive aphasia and R sided lower extremity deficits) , HTN,  a fib on eliquis presenting today for possible seizure. Sister in the room provided most of the history. On exam Pt has signs of tongue biting. Pt neuro exam does not seem altered from baseline.  Will order labs, CT head given recent CVA and her being on Eliquis. Dispo pending work up and reeval.

## 2024-08-22 NOTE — ED ADULT NURSE NOTE - CHIEF COMPLAINT QUOTE
Pt coming in from [facility] for new onset of seizures. Per staff, pt had two seizures today lasting about 1 minute long. Pt A&Ox4 at this time, answering questions appropriately, c/o L sided chest pain that just started. Pt arrived w/ 18g IV to R AC placed by EMS. Pt denies any sob, dizziness, N/V, head trauma, fevers or chills.    PMH CVA (residual expressive aphasia and R sided deficits) , HTN,  a fib

## 2024-08-22 NOTE — CONSULT NOTE ADULT - ATTENDING COMMENTS
She says that her R HP is back to previous baseline.  Speech is slightly worse than recent baseline.    Exam:  Naming intact for some objects.  Rep, reading, spontaneous speech non-fluent, incomplete sentences, paraphasic errors.    Flattening of the right NLF.   R HP - arm 4+ throughout.   Leg: Hip and knee with minimal movement.  Ankle PF&DF: 4+  - this is her baseline now.     A/P  Ms. Bond is a 73 yo woman with new onset seizure - focal onset to B tonic clonic seizure.  Focal epilepsy.  Etiology is structural - old infarct.   I agree with work up and management as above.   Continue anticoagulation (H/O A.fib), statin and antihypertensive medication for stroke risk reduction.   Thank you.

## 2024-08-22 NOTE — H&P ADULT - HISTORY OF PRESENT ILLNESS
This is a 74F with history of CVA with R sided weakness and expressive aphasia (Jan 2024, c/b ICH), pAFib on Eliquis, HTN, HLD, CAD, and OA of b/l knees who presents to the hospital after having seizures x2 at [facility]. Patient states that her first episode happened after she had showered, she started to feel odd and then had LOC, states she was being helped by a staff at the [facility] who witnessed her passing out. She denied known arm/leg shaking, but states she had tongue biting, fecal incontinence, and confusion after. A little while later she had another episode, this one witnessed by her sister and was therefore brought to the hospital. She denies having prior episodes of seizures in the past. Currently states her expressive aphasia is worse than usual but denies any changes in her strength or sensation. Denies headache currently. Denies chest pain currently but states that she had midsternal chest pain earlier in the day. Otherwise reports having a recent URI symptoms with a mild cough but this is improving. No other acute complaints.     On arrival to the ED her vitals were T 98.9, P 85, /81, RR 16, O2 sat 96% RA. Her lab work was significant for elevated and uptrending troponin with nl CK with mildly elevated CKMB. Her Fluvid was negative. Her CXR showed clear lungs. Her CT showed a chronic L sided MCA territory infarct without hemorrhage. She was evaluated by cardiology and neurology. She was given keppra 2g and was admitted to medicine on telemetry.

## 2024-08-22 NOTE — CONSULT NOTE ADULT - ASSESSMENT
This is a 74 year old woman with past medical history of Recent CVA (1/2024) with residual expressive aphasia and right lower extremity weakness [facility], Proxysmal Atrial Fibrillation on Eliquis, Hypertension admitted for Seizures found to have elevated Troponins like stress induces secondary to possible seizures versus ACS given no hest pain, SOB, diaphoresis, flat Troponins and negative CK. This is a 74 year old woman with past medical history of Recent CVA (1/2024) with residual expressive aphasia and right lower extremity weakness [facility], Proxysmal Atrial Fibrillation on Eliquis, Hypertension admitted for Seizures found to have elevated Troponins like stress induces secondary to possible seizures versus ACS given no chest pain, SOB, diaphoresis, flat Troponins and negative CK.

## 2024-08-22 NOTE — ED PROVIDER NOTE - ATTENDING CONTRIBUTION TO CARE
Brief HPI:  74-year-old female past medical history of CVA (residual expressive aphasia and right-sided motor deficits), hypertension, atrial fibrillation on Eliquis presents today for witnessed seizure-like activity.  Patient had 2 episodes of loss of consciousness with generalized shaking of entire body.  Episodes lasted approximately 1 minute.  There was reported tongue biting.  Patient without history of seizures in past.  Denies fever, chills, chest pain, shortness of breath.  Denies alcohol or illicit drug use.    Vitals:   Reviewed    Exam:    GEN:  Non-toxic appearing, non-distressed, speaking full sentences, non-diaphoretic, AAOx3  HEENT:  NCAT, neck supple, EOMI, PERRLA, sclera anicteric, no conjunctival pallor or injection, no stridor, normal voice, no tonsillar exudate, uvula midline, Ecchymosis to right side of tongue  CV:  regular rhythm and rate, s1/s2 audible, no murmurs, rubs or gallops, peripheral pulses 2+ and symmetric  PULM:  non-labored respirations, lungs clear to auscultation bilaterally, no wheezes, crackles or rales  ABD:  non distended, non-tender, no rebound, no guarding, negative Roach's sign, bowel sounds normal, no cvat  MSK:  no gross deformity, non-tender extremities and joints, range of motion grossly normal appearing, no extremity edema, extremities warm and well perfused   NEURO:  AAOx3, CN II-XII intact, Right lower extremity motor deficit compared to the left, sensation grossly intact, finger to nose testing wnl, no nystagmus, negative Romberg, no pronator drift, no gait deficit  SKIN:  warm, dry, no rash or vesicles     A/P: 74-year-old female past medical history of CVA (residual expressive aphasia and right-sided motor deficits), hypertension, atrial fibrillation on Eliquis presents today for witnessed seizure-like activity.  Vital signs stable.  EKG nonischemic.  Possible new onset seizure, patient is not postictal at this time.  Syncope also considered.  Will send labs, head CT, neuro consult.  Disposition pending.

## 2024-08-22 NOTE — CONSULT NOTE ADULT - PROBLEM SELECTOR RECOMMENDATION 9
post possible seizures  Admit to Telemetry  No need for anti-platelet therapy at this time  Monitor Troponin, CK and CKMB q8h x 3 with serial ECG and contact Cardiology for any significant changes  Obtain TSH, Lipid Profile, Hga1c, T+S  and pBNP  Daily CBC, BMP Mag Phos, PT/INR/PTT  Cardiology to follow  Plan discussed with DEMETRI Reich Fellow

## 2024-08-22 NOTE — ED ADULT TRIAGE NOTE - CHIEF COMPLAINT QUOTE
Pt coming in from [facility] for new onset of seizures. Per staff, pt had two seizures today lasting about 1 minute long. Pt A&Ox4 at this time, answering questions appropriately, c/o L sided chest pain that just started. Pt arrived w/ 18g IV to R AC placed by EMS. Pt denies any sob, dizziness, N/V, head trauma, fevers or chills.    PMH CVA (residual expressive aphasia and R sided deficits) , HTN,  a fib

## 2024-08-23 ENCOUNTER — RESULT REVIEW (OUTPATIENT)
Age: 74
End: 2024-08-23

## 2024-08-23 DIAGNOSIS — J06.9 ACUTE UPPER RESPIRATORY INFECTION, UNSPECIFIED: ICD-10-CM

## 2024-08-23 DIAGNOSIS — R60.0 LOCALIZED EDEMA: ICD-10-CM

## 2024-08-23 DIAGNOSIS — Z86.73 PERSONAL HISTORY OF TRANSIENT ISCHEMIC ATTACK (TIA), AND CEREBRAL INFARCTION WITHOUT RESIDUAL DEFICITS: ICD-10-CM

## 2024-08-23 DIAGNOSIS — I10 ESSENTIAL (PRIMARY) HYPERTENSION: ICD-10-CM

## 2024-08-23 DIAGNOSIS — I48.0 PAROXYSMAL ATRIAL FIBRILLATION: ICD-10-CM

## 2024-08-23 DIAGNOSIS — E78.5 HYPERLIPIDEMIA, UNSPECIFIED: ICD-10-CM

## 2024-08-23 DIAGNOSIS — Z29.9 ENCOUNTER FOR PROPHYLACTIC MEASURES, UNSPECIFIED: ICD-10-CM

## 2024-08-23 DIAGNOSIS — R56.9 UNSPECIFIED CONVULSIONS: ICD-10-CM

## 2024-08-23 DIAGNOSIS — R09.89 OTHER SPECIFIED SYMPTOMS AND SIGNS INVOLVING THE CIRCULATORY AND RESPIRATORY SYSTEMS: ICD-10-CM

## 2024-08-23 LAB
A1C WITH ESTIMATED AVERAGE GLUCOSE RESULT: 5.3 % — SIGNIFICANT CHANGE UP (ref 4–5.6)
ADD ON TEST-SPECIMEN IN LAB: SIGNIFICANT CHANGE UP
ALBUMIN SERPL ELPH-MCNC: 3.5 G/DL — SIGNIFICANT CHANGE UP (ref 3.3–5)
ALP SERPL-CCNC: 92 U/L — SIGNIFICANT CHANGE UP (ref 40–120)
ALT FLD-CCNC: 9 U/L — SIGNIFICANT CHANGE UP (ref 4–33)
ANION GAP SERPL CALC-SCNC: 12 MMOL/L — SIGNIFICANT CHANGE UP (ref 7–14)
APTT BLD: 37.9 SEC — HIGH (ref 24.5–35.6)
AST SERPL-CCNC: 17 U/L — SIGNIFICANT CHANGE UP (ref 4–32)
BASOPHILS # BLD AUTO: 0.03 K/UL — SIGNIFICANT CHANGE UP (ref 0–0.2)
BASOPHILS NFR BLD AUTO: 0.4 % — SIGNIFICANT CHANGE UP (ref 0–2)
BILIRUB SERPL-MCNC: 0.4 MG/DL — SIGNIFICANT CHANGE UP (ref 0.2–1.2)
BLD GP AB SCN SERPL QL: NEGATIVE — SIGNIFICANT CHANGE UP
BUN SERPL-MCNC: 15 MG/DL — SIGNIFICANT CHANGE UP (ref 7–23)
CALCIUM SERPL-MCNC: 9.5 MG/DL — SIGNIFICANT CHANGE UP (ref 8.4–10.5)
CHLORIDE SERPL-SCNC: 105 MMOL/L — SIGNIFICANT CHANGE UP (ref 98–107)
CHOLEST SERPL-MCNC: 154 MG/DL — SIGNIFICANT CHANGE UP
CK MB BLD-MCNC: SIGNIFICANT CHANGE UP % (ref 0–2.5)
CK MB CFR SERPL CALC: 5 NG/ML — HIGH
CK SERPL-CCNC: 87 U/L — SIGNIFICANT CHANGE UP (ref 25–170)
CO2 SERPL-SCNC: 23 MMOL/L — SIGNIFICANT CHANGE UP (ref 22–31)
CREAT SERPL-MCNC: 0.74 MG/DL — SIGNIFICANT CHANGE UP (ref 0.5–1.3)
EGFR: 85 ML/MIN/1.73M2 — SIGNIFICANT CHANGE UP
EOSINOPHIL # BLD AUTO: 0.04 K/UL — SIGNIFICANT CHANGE UP (ref 0–0.5)
EOSINOPHIL NFR BLD AUTO: 0.5 % — SIGNIFICANT CHANGE UP (ref 0–6)
ESTIMATED AVERAGE GLUCOSE: 105 — SIGNIFICANT CHANGE UP
GLUCOSE SERPL-MCNC: 93 MG/DL — SIGNIFICANT CHANGE UP (ref 70–99)
HCT VFR BLD CALC: 34.1 % — LOW (ref 34.5–45)
HDLC SERPL-MCNC: 34 MG/DL — LOW
HGB BLD-MCNC: 11.1 G/DL — LOW (ref 11.5–15.5)
IANC: 5 K/UL — SIGNIFICANT CHANGE UP (ref 1.8–7.4)
IMM GRANULOCYTES NFR BLD AUTO: 0.5 % — SIGNIFICANT CHANGE UP (ref 0–0.9)
INR BLD: 1.71 RATIO — HIGH (ref 0.85–1.18)
LIPID PNL WITH DIRECT LDL SERPL: 104 MG/DL — HIGH
LYMPHOCYTES # BLD AUTO: 2.38 K/UL — SIGNIFICANT CHANGE UP (ref 1–3.3)
LYMPHOCYTES # BLD AUTO: 29.8 % — SIGNIFICANT CHANGE UP (ref 13–44)
MAGNESIUM SERPL-MCNC: 2.2 MG/DL — SIGNIFICANT CHANGE UP (ref 1.6–2.6)
MCHC RBC-ENTMCNC: 29.2 PG — SIGNIFICANT CHANGE UP (ref 27–34)
MCHC RBC-ENTMCNC: 32.6 GM/DL — SIGNIFICANT CHANGE UP (ref 32–36)
MCV RBC AUTO: 89.7 FL — SIGNIFICANT CHANGE UP (ref 80–100)
MONOCYTES # BLD AUTO: 0.5 K/UL — SIGNIFICANT CHANGE UP (ref 0–0.9)
MONOCYTES NFR BLD AUTO: 6.3 % — SIGNIFICANT CHANGE UP (ref 2–14)
NEUTROPHILS # BLD AUTO: 5 K/UL — SIGNIFICANT CHANGE UP (ref 1.8–7.4)
NEUTROPHILS NFR BLD AUTO: 62.5 % — SIGNIFICANT CHANGE UP (ref 43–77)
NON HDL CHOLESTEROL: 120 MG/DL — SIGNIFICANT CHANGE UP
NRBC # BLD: 0 /100 WBCS — SIGNIFICANT CHANGE UP (ref 0–0)
NRBC # FLD: 0 K/UL — SIGNIFICANT CHANGE UP (ref 0–0)
NT-PROBNP SERPL-SCNC: 532 PG/ML — HIGH
PHOSPHATE SERPL-MCNC: 4.4 MG/DL — SIGNIFICANT CHANGE UP (ref 2.5–4.5)
PLATELET # BLD AUTO: 321 K/UL — SIGNIFICANT CHANGE UP (ref 150–400)
POTASSIUM SERPL-MCNC: 3.8 MMOL/L — SIGNIFICANT CHANGE UP (ref 3.5–5.3)
POTASSIUM SERPL-SCNC: 3.8 MMOL/L — SIGNIFICANT CHANGE UP (ref 3.5–5.3)
PROT SERPL-MCNC: 7.2 G/DL — SIGNIFICANT CHANGE UP (ref 6–8.3)
PROTHROM AB SERPL-ACNC: 18.9 SEC — HIGH (ref 9.5–13)
RBC # BLD: 3.8 M/UL — SIGNIFICANT CHANGE UP (ref 3.8–5.2)
RBC # FLD: 14 % — SIGNIFICANT CHANGE UP (ref 10.3–14.5)
RH IG SCN BLD-IMP: POSITIVE — SIGNIFICANT CHANGE UP
SODIUM SERPL-SCNC: 140 MMOL/L — SIGNIFICANT CHANGE UP (ref 135–145)
TRIGL SERPL-MCNC: 82 MG/DL — SIGNIFICANT CHANGE UP
TROPONIN T, HIGH SENSITIVITY RESULT: 178 NG/L — CRITICAL HIGH
TSH SERPL-MCNC: 2.29 UIU/ML — SIGNIFICANT CHANGE UP (ref 0.27–4.2)
WBC # BLD: 7.99 K/UL — SIGNIFICANT CHANGE UP (ref 3.8–10.5)
WBC # FLD AUTO: 7.99 K/UL — SIGNIFICANT CHANGE UP (ref 3.8–10.5)

## 2024-08-23 PROCEDURE — 99233 SBSQ HOSP IP/OBS HIGH 50: CPT

## 2024-08-23 PROCEDURE — 93970 EXTREMITY STUDY: CPT | Mod: 26

## 2024-08-23 PROCEDURE — 99222 1ST HOSP IP/OBS MODERATE 55: CPT

## 2024-08-23 PROCEDURE — 93306 TTE W/DOPPLER COMPLETE: CPT | Mod: 26

## 2024-08-23 RX ORDER — POVIDONE, PROPYLENE GLYCOL 6.8; 3 MG/ML; MG/ML
1 LIQUID OPHTHALMIC
Refills: 0 | Status: DISCONTINUED | OUTPATIENT
Start: 2024-08-23 | End: 2024-08-27

## 2024-08-23 RX ORDER — LEVETIRACETAM 1000 MG/1
750 TABLET ORAL EVERY 12 HOURS
Refills: 0 | Status: DISCONTINUED | OUTPATIENT
Start: 2024-08-23 | End: 2024-08-26

## 2024-08-23 RX ORDER — ONDANSETRON 2 MG/ML
4 INJECTION, SOLUTION INTRAMUSCULAR; INTRAVENOUS EVERY 8 HOURS
Refills: 0 | Status: DISCONTINUED | OUTPATIENT
Start: 2024-08-23 | End: 2024-08-27

## 2024-08-23 RX ORDER — ENOXAPARIN SODIUM 100 MG/ML
40 INJECTION SUBCUTANEOUS EVERY 24 HOURS
Refills: 0 | Status: DISCONTINUED | OUTPATIENT
Start: 2024-08-23 | End: 2024-08-23

## 2024-08-23 RX ORDER — ASCORBIC ACID/ASCORBATE SODIUM 500 MG
500 TABLET,CHEWABLE ORAL DAILY
Refills: 0 | Status: DISCONTINUED | OUTPATIENT
Start: 2024-08-23 | End: 2024-08-27

## 2024-08-23 RX ORDER — MAGNESIUM, ALUMINUM HYDROXIDE 200-225/5
30 SUSPENSION, ORAL (FINAL DOSE FORM) ORAL EVERY 4 HOURS
Refills: 0 | Status: DISCONTINUED | OUTPATIENT
Start: 2024-08-23 | End: 2024-08-27

## 2024-08-23 RX ORDER — GABAPENTIN 100 MG
300 CAPSULE ORAL THREE TIMES A DAY
Refills: 0 | Status: DISCONTINUED | OUTPATIENT
Start: 2024-08-23 | End: 2024-08-27

## 2024-08-23 RX ORDER — APIXABAN 5 MG/1
5 TABLET, FILM COATED ORAL EVERY 12 HOURS
Refills: 0 | Status: DISCONTINUED | OUTPATIENT
Start: 2024-08-23 | End: 2024-08-27

## 2024-08-23 RX ORDER — APIXABAN 5 MG/1
1 TABLET, FILM COATED ORAL
Refills: 0 | DISCHARGE

## 2024-08-23 RX ORDER — ACETAMINOPHEN 325 MG/1
650 TABLET ORAL EVERY 6 HOURS
Refills: 0 | Status: DISCONTINUED | OUTPATIENT
Start: 2024-08-23 | End: 2024-08-27

## 2024-08-23 RX ORDER — FLUTICASONE PROPIONATE 50 UG/1
1 SPRAY, METERED NASAL
Refills: 0 | Status: DISCONTINUED | OUTPATIENT
Start: 2024-08-23 | End: 2024-08-27

## 2024-08-23 RX ADMIN — Medication 300 MILLIGRAM(S): at 21:17

## 2024-08-23 RX ADMIN — APIXABAN 5 MILLIGRAM(S): 5 TABLET, FILM COATED ORAL at 05:22

## 2024-08-23 RX ADMIN — LEVETIRACETAM 750 MILLIGRAM(S): 1000 TABLET ORAL at 19:57

## 2024-08-23 RX ADMIN — APIXABAN 5 MILLIGRAM(S): 5 TABLET, FILM COATED ORAL at 19:58

## 2024-08-23 RX ADMIN — Medication 300 MILLIGRAM(S): at 15:00

## 2024-08-23 RX ADMIN — Medication 300 MILLIGRAM(S): at 05:22

## 2024-08-23 RX ADMIN — Medication 500 MILLIGRAM(S): at 13:09

## 2024-08-23 RX ADMIN — POVIDONE, PROPYLENE GLYCOL 1 DROP(S): 6.8; 3 LIQUID OPHTHALMIC at 19:58

## 2024-08-23 RX ADMIN — LEVETIRACETAM 750 MILLIGRAM(S): 1000 TABLET ORAL at 05:22

## 2024-08-23 RX ADMIN — POVIDONE, PROPYLENE GLYCOL 1 DROP(S): 6.8; 3 LIQUID OPHTHALMIC at 05:23

## 2024-08-23 RX ADMIN — Medication 40 MILLIGRAM(S): at 21:17

## 2024-08-23 RX ADMIN — Medication 1 TABLET(S): at 13:08

## 2024-08-23 RX ADMIN — FLUTICASONE PROPIONATE 1 SPRAY(S): 50 SPRAY, METERED NASAL at 19:59

## 2024-08-23 NOTE — SWALLOW BEDSIDE ASSESSMENT ADULT - ADDITIONAL RECOMMENDATIONS
This department to follow up as schedule permits to assess for diet tolerance. Monitor for any neurological changes that may impact patient's PO intake. Medical team further advised to reconsult if there is a change in medical status and/or observed change in patient's tolerance of recommended PO diet.

## 2024-08-23 NOTE — PROGRESS NOTE ADULT - PROBLEM SELECTOR PLAN 1
- Patient with new onset, possibly in setting of her chronic stroke  - Neurology eval appreciated -> check vEEG, keppra 750mg BID (timed for 6 hours after her initial load)  - Seizure precautions, Fall and aspiration precautions  - Dysphagia screen, S&S eval  - f/u further neurology recs in AM - Patient with new onset likely iso prior stroke history  - Neurology following, recommendations appreciated  - check vEEG, keppra 750mg BID (timed for 6 hours after her initial load)  - Seizure precautions, Fall and aspiration precautions  - Dysphagia screen, S&S eval

## 2024-08-23 NOTE — SWALLOW BEDSIDE ASSESSMENT ADULT - SWALLOW EVAL: DIAGNOSIS
Mild oral stage for regular solids and thin liquids marked by adequate oral containment, slow bolus manipulation, slow/prolonged mastication for regular solids with slow anterior to posterior transfer, with mild oral residue noted which clears with liquid wash. Functional pharyngeal stage for regular solids and thin liquids marked by initiation of the pharyngeal swallow with hyolaryngeal excursion upon palpation without evidence of impaired airway protection.

## 2024-08-23 NOTE — PROGRESS NOTE ADULT - PROBLEM SELECTOR PLAN 4
- Patient with recent URI with sore throat, nasal congestion, and cough, now improving  - Here no leukocytosis, no fever, Fluvid neg  - Monitor symptomatically for now - Patient with recent URI with sore throat, nasal congestion, and cough, now improving  - Here no leukocytosis, no fever, Flu/covid/RSV negative  - Monitor symptomatically for now

## 2024-08-23 NOTE — SPEECH LANGUAGE PATHOLOGY EVALUATION - SLP DIAGNOSIS
Patient presents with Mild to Moderate Expressive Language Deficits characterized by adequate auditory comprehension, fluent output, reduced ability for automatic/repetition tasks with word finding deficits at the sentence/conversational level evident by hesitation at times during unstructured tasks and benefits from semantic and phonemic cueing. Patient presents with adequate motor speech output characterized by adequate articulation, rate, and volume to communicate basic wants and needs. Patient presents with Mild/Moderate Expressive Language Deficits characterized by adequate auditory comprehension, fluent output, reduced ability for automatic/repetition tasks with word finding deficits at the sentence/conversational level evident by hesitation at times during unstructured tasks and benefits from semantic and phonemic cueing. Patient presents with adequate motor speech output characterized by adequate articulation, rate, and volume to communicate basic wants and needs. Patient presents with Mild/Moderate Expressive Language Deficits characterized by adequate auditory comprehension, nonfluent output, reduced ability for automatic/repetition tasks with word finding deficits at the sentence/conversational level evident by hesitation during structured and unstructured tasks and benefits from semantic and phonemic cueing. Patient presents with adequate motor speech output characterized by adequate articulation, rate, and volume to communicate basic wants and needs.

## 2024-08-23 NOTE — SWALLOW BEDSIDE ASSESSMENT ADULT - SWALLOW EVAL: RECOMMENDED FEEDING/EATING TECHNIQUES
Swallowing Guidelines: Seated Upright during Mealtimes; Slow Pacing; Small Bites; Single Sips; One Bite/Sip at a Time; Maintain Adequate Oral Hygiene

## 2024-08-23 NOTE — PROGRESS NOTE ADULT - PROBLEM SELECTOR PLAN 3
- Patient with R leg non-pitting edema, does have pain in b/l legs but also with ?polyneuropathy on gabapentin  - No erythema, no warmth  - Pt reports having chronic RLE edema  - Dopplers of b/l LE negative for DVT, however exam was limited. Pt also already on eliquis - Patient with R leg non-pitting edema, does have pain in b/l legs but also with ?polyneuropathy on gabapentin  - States her RLE edema is chronic for >7 months  - No erythema, no warmth  - Pt reports having chronic RLE edema  - Dopplers of b/l LE negative for DVT, however exam was limited. Pt also reports adherence to eliquis

## 2024-08-23 NOTE — PHYSICAL THERAPY INITIAL EVALUATION ADULT - RANGE OF MOTION EXAMINATION, REHAB EVAL
right LE ROM limited due to prior CVA/bilateral upper extremity ROM was WFL (within functional limits)/Left LE ROM was WFL (within functional limits)

## 2024-08-23 NOTE — PROGRESS NOTE ADULT - SUBJECTIVE AND OBJECTIVE BOX
PROGRESS NOTE:   Authored by Stormy Michele Ma, MD  Available on MS Teams; Pager [phone]    Patient is a 74y old  Female who presents with a chief complaint of Seizures x2 (22 Aug 2024 22:50)    SUBJECTIVE / OVERNIGHT EVENTS:    All other review of systems is negative unless indicated above.    MEDICATIONS  (STANDING):  apixaban 5 milliGRAM(s) Oral every 12 hours  artificial  tears Solution 1 Drop(s) Both EYES two times a day  ascorbic acid 500 milliGRAM(s) Oral daily  atorvastatin 40 milliGRAM(s) Oral at bedtime  fluticasone propionate 50 MICROgram(s)/spray Nasal Spray 1 Spray(s) Both Nostrils two times a day  gabapentin 300 milliGRAM(s) Oral three times a day  levETIRAcetam   Injectable 750 milliGRAM(s) IV Push every 12 hours  multivitamin 1 Tablet(s) Oral daily    MEDICATIONS  (PRN):  acetaminophen     Tablet .. 650 milliGRAM(s) Oral every 6 hours PRN Temp greater or equal to 38C (100.4F), Mild Pain (1 - 3)  aluminum hydroxide/magnesium hydroxide/simethicone Suspension 30 milliLiter(s) Oral every 4 hours PRN Dyspepsia  melatonin 3 milliGRAM(s) Oral at bedtime PRN Insomnia  ondansetron Injectable 4 milliGRAM(s) IV Push every 8 hours PRN Nausea and/or Vomiting    CAPILLARY BLOOD GLUCOSE      POCT Blood Glucose.: 131 mg/dL (22 Aug 2024 15:25)    PHYSICAL EXAM:  Vital Signs Last 24 Hrs  T(C): 36 (23 Aug 2024 09:22), Max: 37.2 (22 Aug 2024 15:13)  T(F): 96.8 (23 Aug 2024 09:22), Max: 98.9 (22 Aug 2024 15:13)  HR: 68 (23 Aug 2024 09:22) (57 - 85)  BP: 137/74 (23 Aug 2024 09:22) (123/83 - 143/81)  BP(mean): --  RR: 20 (23 Aug 2024 09:22) (16 - 20)  SpO2: 100% (23 Aug 2024 09:22) (96% - 100%)    Parameters below as of 23 Aug 2024 09:22  Patient On (Oxygen Delivery Method): nasal cannula    GENERAL: No acute distress  HEAD:  Normocephalic, Atraumatic  EYES: EOMI, conjunctiva and sclera clear  NECK: Supple, no JVD  CHEST/LUNG: CTAB, No wheezes, rales, or rhonchi  HEART: Regular rate and rhythm; No murmurs, rubs, or gallops  ABDOMEN: Soft, non-tender, non-distended  EXTREMITIES:  2+ peripheral pulses b/l, No clubbing, cyanosis, or edema  NEUROLOGY: A&O x 3, no focal deficits  SKIN: No rashes or lesions    LABS:                        11.1   7.99  )-----------( 321      ( 23 Aug 2024 06:00 )             34.1     08-23    140  |  105  |  15  ----------------------------<  93  3.8   |  23  |  0.74    Ca    9.5      23 Aug 2024 06:00  Phos  4.4     08-23  Mg     2.20     08-23    TPro  7.2  /  Alb  3.5  /  TBili  0.4  /  DBili  x   /  AST  17  /  ALT  9   /  AlkPhos  92  08-23      CARDIAC MARKERS ( 22 Aug 2024 19:34 )  x     / x     / x     / x     / 5.5 ng/mL      Urinalysis Basic - ( 23 Aug 2024 06:00 )    Color: x / Appearance: x / SG: x / pH: x  Gluc: 93 mg/dL / Ketone: x  / Bili: x / Urobili: x   Blood: x / Protein: x / Nitrite: x   Leuk Esterase: x / RBC: x / WBC x   Sq Epi: x / Non Sq Epi: x / Bacteria: x    RADIOLOGY & ADDITIONAL TESTS:  ACC: [id number] EXAM:  US DPLX LWR EXT VEINS COMPL BI   ORDERED BY: ARCHANA ISAAC     PROCEDURE DATE:  08/23/2024      IMPRESSION:  Limited exam.  No evidence for DVT in the common femoral and femoral veins.  Suboptimal evaluation of popliteal and calf veins due to patient   discomfort and body habitus.    ACC: [id number] EXAM:  CT ANGIO BRAIN (W)AW IC   ORDERED BY: JOSH TREVINO   ACC: [id number] EXAM:  CT ANGIO NECK (W)AW IC   ORDERED BY: JOSH TREVINO     PROCEDURE DATE:  08/22/2024      INTERPRETATION:    CLINICAL INFORMATION: New seizure. History of stroke.    IMPRESSION:    Head CT:  1. Chronic left-sided superior division MCA territory infarct.  2. No acute intracranial hemorrhage.    CTA neck and head:  1. No large vessel occlusion or major stenosis.   PROGRESS NOTE:   Authored by Stormy Michele Ma, MD  Available on MS Teams; Pager [phone]    Patient is a 74y old  Female who presents with a chief complaint of Seizures x2 (22 Aug 2024 22:50)    SUBJECTIVE / OVERNIGHT EVENTS:  NAEON. No complaints this morning.    All other review of systems is negative unless indicated above.    MEDICATIONS  (STANDING):  apixaban 5 milliGRAM(s) Oral every 12 hours  artificial  tears Solution 1 Drop(s) Both EYES two times a day  ascorbic acid 500 milliGRAM(s) Oral daily  atorvastatin 40 milliGRAM(s) Oral at bedtime  fluticasone propionate 50 MICROgram(s)/spray Nasal Spray 1 Spray(s) Both Nostrils two times a day  gabapentin 300 milliGRAM(s) Oral three times a day  levETIRAcetam   Injectable 750 milliGRAM(s) IV Push every 12 hours  multivitamin 1 Tablet(s) Oral daily    MEDICATIONS  (PRN):  acetaminophen     Tablet .. 650 milliGRAM(s) Oral every 6 hours PRN Temp greater or equal to 38C (100.4F), Mild Pain (1 - 3)  aluminum hydroxide/magnesium hydroxide/simethicone Suspension 30 milliLiter(s) Oral every 4 hours PRN Dyspepsia  melatonin 3 milliGRAM(s) Oral at bedtime PRN Insomnia  ondansetron Injectable 4 milliGRAM(s) IV Push every 8 hours PRN Nausea and/or Vomiting    CAPILLARY BLOOD GLUCOSE    POCT Blood Glucose.: 131 mg/dL (22 Aug 2024 15:25)    PHYSICAL EXAM:  Vital Signs Last 24 Hrs  T(C): 36 (23 Aug 2024 09:22), Max: 37.2 (22 Aug 2024 15:13)  T(F): 96.8 (23 Aug 2024 09:22), Max: 98.9 (22 Aug 2024 15:13)  HR: 68 (23 Aug 2024 09:22) (57 - 85)  BP: 137/74 (23 Aug 2024 09:22) (123/83 - 143/81)  BP(mean): --  RR: 20 (23 Aug 2024 09:22) (16 - 20)  SpO2: 100% (23 Aug 2024 09:22) (96% - 100%)    Parameters below as of 23 Aug 2024 09:22  Patient On (Oxygen Delivery Method): nasal cannula    GENERAL: No acute distress  HEAD:  Normocephalic, Atraumatic  EYES: EOMI, conjunctiva and sclera clear  NECK: Supple  CHEST/LUNG: CTAB, No wheezes, rales, or rhonchi  HEART: Regular rate and rhythm; No murmurs, rubs, or gallops  ABDOMEN: Soft, non-tender, non-distended  EXTREMITIES: Non-pitting edema to RLE with R posterior calf ttp  NEUROLOGY: A&O x 3, expressive aphasia, 0/5 strength in RLE  SKIN: No rashes or lesions    LABS:                        11.1   7.99  )-----------( 321      ( 23 Aug 2024 06:00 )             34.1     08-23    140  |  105  |  15  ----------------------------<  93  3.8   |  23  |  0.74    Ca    9.5      23 Aug 2024 06:00  Phos  4.4     08-23  Mg     2.20     08-23    TPro  7.2  /  Alb  3.5  /  TBili  0.4  /  DBili  x   /  AST  17  /  ALT  9   /  AlkPhos  92  08-23      CARDIAC MARKERS ( 22 Aug 2024 19:34 )  x     / x     / x     / x     / 5.5 ng/mL    Urinalysis Basic - ( 23 Aug 2024 06:00 )    Color: x / Appearance: x / SG: x / pH: x  Gluc: 93 mg/dL / Ketone: x  / Bili: x / Urobili: x   Blood: x / Protein: x / Nitrite: x   Leuk Esterase: x / RBC: x / WBC x   Sq Epi: x / Non Sq Epi: x / Bacteria: x    RADIOLOGY & ADDITIONAL TESTS:  ACC: [id number] EXAM:  US DPLX LWR EXT VEINS COMPL BI   ORDERED BY: ARCHANA ISAAC     PROCEDURE DATE:  08/23/2024      IMPRESSION:  Limited exam.  No evidence for DVT in the common femoral and femoral veins.  Suboptimal evaluation of popliteal and calf veins due to patient   discomfort and body habitus.    ACC: [id number] EXAM:  CT ANGIO BRAIN (W)AW IC   ORDERED BY: JOSH TREVINO   ACC: [id number] EXAM:  CT ANGIO NECK (W)AW IC   ORDERED BY: JOSH TREVINO     PROCEDURE DATE:  08/22/2024      INTERPRETATION:    CLINICAL INFORMATION: New seizure. History of stroke.    IMPRESSION:    Head CT:  1. Chronic left-sided superior division MCA territory infarct.  2. No acute intracranial hemorrhage.    CTA neck and head:  1. No large vessel occlusion or major stenosis.

## 2024-08-23 NOTE — PHYSICAL THERAPY INITIAL EVALUATION ADULT - GENERAL OBSERVATIONS, REHAB EVAL
Chart reviewed and cleared for PT by MARYCHUY Wilson. Pt received semi supine in bed in NAD, all lines intact + telemetry, HR 66.

## 2024-08-23 NOTE — PHYSICAL THERAPY INITIAL EVALUATION ADULT - PERTINENT HX OF CURRENT PROBLEM, REHAB EVAL
Pt is a 74 year old female with history of CVA with R sided weakness and expressive aphasia (Jan 2024, c/b ICH), pAFib on Eliquis, HTN, HLD, CAD, and OA of b/l knees who presents to the hospital after having seizures x2 at [facility]. Patient states that her first episode happened after she had showered, she started to feel odd and then had LOC, states she was being helped by a staff at the [facility] who witnessed her passing out. She denied known arm/leg shaking, but states she had tongue biting, fecal incontinence, and confusion after. A little while later she had another episode, this one witnessed by her sister and was therefore brought to the hospital. She denies having prior episodes of seizures in the past. Currently states her expressive aphasia is worse than usual but denies any changes in her strength or sensation. Denies headache currently. Denies chest pain currently but states that she had midsternal chest pain earlier in the day. Otherwise reports having a recent URI symptoms with a mild cough but this is improving. No other acute complaints.

## 2024-08-23 NOTE — PHYSICAL THERAPY INITIAL EVALUATION ADULT - ADDITIONAL COMMENTS
Pt is from rehab facility. Pt is minimally ambulatory and uses a wheelchair. Pt reports she can independently perform a squat pivot transfer and requires assistance with ADLs.   Pt left semi supine in bed in NAD, all lines intact, call escobar in reach and MARYCHUY Wilson made aware.

## 2024-08-23 NOTE — SWALLOW BEDSIDE ASSESSMENT ADULT - ASR SWALLOW RECOMMEND DIAG
Objective testing not warranted at this time given no overt signs of impaired airway protection with CXR 8/22 indicating clear lungs

## 2024-08-23 NOTE — SPEECH LANGUAGE PATHOLOGY EVALUATION - COMMENTS
Progress Note- Hospitalist 8/23: "73 y/o F with history of CVA with residual R sided weakness and expressive aphasia, pAFib on DOAC, HTN, HLD, CAD, and OA presents to the hospital with new onset seizures x2."    Neuro Note 8/22: "Impression: 2 transient seizure like episodes possible focal with secondary generalization due to chronic stroke"     CT Head 8/22: "IMPRESSION: Head CT: 1. Chronic left-sided superior division MCA territory infarct. 2. No acute intracranial hemorrhage.   CTA neck and head: 1. No large vessel occlusion or major stenosis."    Patient also seen for clinical swallow evaluation (see note).     Patient seen awake/alert. Patient reports difficulty with speech output. She states, "I have trouble getting my words out." This department to follow up as schedule permits for speech therapy. This patient will benefit a comprehensive speech and language evaluation and  speech therapy pending discharge planning (e.g. Homecare vs. Rehab vs. [facility] [place] [phone]). Medical team further advised to reconsult as clinically indicated.

## 2024-08-23 NOTE — SWALLOW BEDSIDE ASSESSMENT ADULT - COMMENTS
Progress Note- Hospitalist 8/23: "75 y/o F with history of CVA with residual R sided weakness and expressive aphasia, pAFib on DOAC, HTN, HLD, CAD, and OA presents to the hospital with new onset seizures x2."    Neuro Note 8/22: "Impression: 2 transient seizure like episodes possible focal with secondary generalization due to chronic stroke"     CXR 8/22: "IMPRESSION: Clear lungs."    Patient seen awake/alert to verbal stimuli this PM. Patient denies difficulty swallowing and is receptive to PO trials. Progress Note- Hospitalist 8/23: "75 y/o F with history of CVA with residual R sided weakness and expressive aphasia, pAFib on DOAC, HTN, HLD, CAD, and OA presents to the hospital with new onset seizures x2."    Neuro Note 8/22: "Impression: 2 transient seizure like episodes possible focal with secondary generalization due to chronic stroke"     CT Head 8/22: "IMPRESSION: Head CT: 1. Chronic left-sided superior division MCA territory infarct. 2. No acute intracranial hemorrhage.   CTA neck and head: 1. No large vessel occlusion or major stenosis."    CXR 8/22: "IMPRESSION: Clear lungs."    Patient seen awake/alert to verbal stimuli this PM. Patient denies difficulty swallowing and is receptive to PO trials.

## 2024-08-23 NOTE — PROGRESS NOTE ADULT - PROBLEM SELECTOR PLAN 6
- Not on anti-platelets currently -> clarify with neurology in AM to see if she should be started on a baby aspirin  - c/w statin therapy  - PT eval and speech eval ordered - Not on anti-platelets, will clarify with neurology to see if she should be on ASA  - c/w statin therapy  - PT eval and speech eval ordered

## 2024-08-23 NOTE — PROGRESS NOTE ADULT - PROBLEM SELECTOR PLAN 2
- Patient with elevated troponin with uptrend on repeat but CK/CKMB not significant, now downtrended this AM, likely iso demand  - Patient reported having midsternal chest pain prior to admission but none currently, no palpitations or dizziness/LOC currently  - EKG with TWI in V1-V2 but no significant findings of ischemia  - Cardiology following  - f/u TTE, repeat EKG  - A1c 5.3, , TSH WNL, BNP elevated  - Of note, patient noted to have a CAYDEN loudest at the LUSB, she is not sure if she has had a murmur in the past or not, said her sister would know better -> f/u TTE, clarify with sister in AM - Patient with elevated troponin with uptrend on repeat but CK/CKMB not significant, now downtrended this AM, likely iso demand  - Patient reported having midsternal chest pain prior to admission but none currently, no palpitations or dizziness/LOC currently  - EKG with TWI in V1-V2 but no significant findings of ischemia  - Cardiology following  - f/u TTE, repeat EKG  - A1c 5.3, , TSH WNL, BNP elevated

## 2024-08-24 LAB
ANION GAP SERPL CALC-SCNC: 10 MMOL/L — SIGNIFICANT CHANGE UP (ref 7–14)
BUN SERPL-MCNC: 13 MG/DL — SIGNIFICANT CHANGE UP (ref 7–23)
CALCIUM SERPL-MCNC: 8.9 MG/DL — SIGNIFICANT CHANGE UP (ref 8.4–10.5)
CHLORIDE SERPL-SCNC: 105 MMOL/L — SIGNIFICANT CHANGE UP (ref 98–107)
CO2 SERPL-SCNC: 25 MMOL/L — SIGNIFICANT CHANGE UP (ref 22–31)
CREAT SERPL-MCNC: 0.82 MG/DL — SIGNIFICANT CHANGE UP (ref 0.5–1.3)
EGFR: 75 ML/MIN/1.73M2 — SIGNIFICANT CHANGE UP
GLUCOSE SERPL-MCNC: 96 MG/DL — SIGNIFICANT CHANGE UP (ref 70–99)
HCT VFR BLD CALC: 30.6 % — LOW (ref 34.5–45)
HGB BLD-MCNC: 9.9 G/DL — LOW (ref 11.5–15.5)
MAGNESIUM SERPL-MCNC: 2.1 MG/DL — SIGNIFICANT CHANGE UP (ref 1.6–2.6)
MCHC RBC-ENTMCNC: 29.9 PG — SIGNIFICANT CHANGE UP (ref 27–34)
MCHC RBC-ENTMCNC: 32.4 GM/DL — SIGNIFICANT CHANGE UP (ref 32–36)
MCV RBC AUTO: 92.4 FL — SIGNIFICANT CHANGE UP (ref 80–100)
NRBC # BLD: 0 /100 WBCS — SIGNIFICANT CHANGE UP (ref 0–0)
NRBC # FLD: 0 K/UL — SIGNIFICANT CHANGE UP (ref 0–0)
PHOSPHATE SERPL-MCNC: 3.8 MG/DL — SIGNIFICANT CHANGE UP (ref 2.5–4.5)
PLATELET # BLD AUTO: 324 K/UL — SIGNIFICANT CHANGE UP (ref 150–400)
POTASSIUM SERPL-MCNC: 4.1 MMOL/L — SIGNIFICANT CHANGE UP (ref 3.5–5.3)
POTASSIUM SERPL-SCNC: 4.1 MMOL/L — SIGNIFICANT CHANGE UP (ref 3.5–5.3)
RBC # BLD: 3.31 M/UL — LOW (ref 3.8–5.2)
RBC # FLD: 13.9 % — SIGNIFICANT CHANGE UP (ref 10.3–14.5)
SODIUM SERPL-SCNC: 140 MMOL/L — SIGNIFICANT CHANGE UP (ref 135–145)
WBC # BLD: 6.85 K/UL — SIGNIFICANT CHANGE UP (ref 3.8–10.5)
WBC # FLD AUTO: 6.85 K/UL — SIGNIFICANT CHANGE UP (ref 3.8–10.5)

## 2024-08-24 PROCEDURE — 99232 SBSQ HOSP IP/OBS MODERATE 35: CPT

## 2024-08-24 PROCEDURE — 95720 EEG PHY/QHP EA INCR W/VEEG: CPT

## 2024-08-24 PROCEDURE — 99233 SBSQ HOSP IP/OBS HIGH 50: CPT

## 2024-08-24 RX ORDER — CHLORHEXIDINE GLUCONATE 40 MG/ML
1 SOLUTION TOPICAL DAILY
Refills: 0 | Status: DISCONTINUED | OUTPATIENT
Start: 2024-08-24 | End: 2024-08-27

## 2024-08-24 RX ADMIN — Medication 40 MILLIGRAM(S): at 21:26

## 2024-08-24 RX ADMIN — APIXABAN 5 MILLIGRAM(S): 5 TABLET, FILM COATED ORAL at 18:10

## 2024-08-24 RX ADMIN — Medication 300 MILLIGRAM(S): at 05:20

## 2024-08-24 RX ADMIN — LEVETIRACETAM 750 MILLIGRAM(S): 1000 TABLET ORAL at 05:21

## 2024-08-24 RX ADMIN — FLUTICASONE PROPIONATE 1 SPRAY(S): 50 SPRAY, METERED NASAL at 05:20

## 2024-08-24 RX ADMIN — Medication 300 MILLIGRAM(S): at 14:19

## 2024-08-24 RX ADMIN — POVIDONE, PROPYLENE GLYCOL 1 DROP(S): 6.8; 3 LIQUID OPHTHALMIC at 05:18

## 2024-08-24 RX ADMIN — LEVETIRACETAM 750 MILLIGRAM(S): 1000 TABLET ORAL at 18:52

## 2024-08-24 RX ADMIN — Medication 300 MILLIGRAM(S): at 21:28

## 2024-08-24 RX ADMIN — APIXABAN 5 MILLIGRAM(S): 5 TABLET, FILM COATED ORAL at 05:20

## 2024-08-24 RX ADMIN — Medication 500 MILLIGRAM(S): at 12:42

## 2024-08-24 RX ADMIN — CHLORHEXIDINE GLUCONATE 1 APPLICATION(S): 40 SOLUTION TOPICAL at 12:43

## 2024-08-24 NOTE — PROGRESS NOTE ADULT - SUBJECTIVE AND OBJECTIVE BOX
[facility] Division of Hospital Medicine  Sophie Martinez DO  Pager (MAICO-F, 8A-5P): [phone]      Patient is a 74y old  Female who presents with a chief complaint of Seizures x2 (23 Aug 2024 10:26)      SUBJECTIVE / OVERNIGHT EVENTS: No events overnight. Reports some right knee pain    MEDICATIONS  (STANDING):  apixaban 5 milliGRAM(s) Oral every 12 hours  artificial  tears Solution 1 Drop(s) Both EYES two times a day  ascorbic acid 500 milliGRAM(s) Oral daily  atorvastatin 40 milliGRAM(s) Oral at bedtime  chlorhexidine 2% Cloths 1 Application(s) Topical daily  fluticasone propionate 50 MICROgram(s)/spray Nasal Spray 1 Spray(s) Both Nostrils two times a day  gabapentin 300 milliGRAM(s) Oral three times a day  levETIRAcetam   Injectable 750 milliGRAM(s) IV Push every 12 hours  multivitamin 1 Tablet(s) Oral daily    MEDICATIONS  (PRN):  acetaminophen     Tablet .. 650 milliGRAM(s) Oral every 6 hours PRN Temp greater or equal to 38C (100.4F), Mild Pain (1 - 3)  aluminum hydroxide/magnesium hydroxide/simethicone Suspension 30 milliLiter(s) Oral every 4 hours PRN Dyspepsia  melatonin 3 milliGRAM(s) Oral at bedtime PRN Insomnia  ondansetron Injectable 4 milliGRAM(s) IV Push every 8 hours PRN Nausea and/or Vomiting      CAPILLARY BLOOD GLUCOSE        I&O's Summary      PHYSICAL EXAM:  Vital Signs Last 24 Hrs  T(C): 36.8 (24 Aug 2024 05:15), Max: 36.8 (24 Aug 2024 05:15)  T(F): 98.3 (24 Aug 2024 05:15), Max: 98.3 (24 Aug 2024 05:15)  HR: 61 (24 Aug 2024 05:15) (61 - 74)  BP: 137/60 (24 Aug 2024 05:15) (118/74 - 137/60)  BP(mean): --  RR: 17 (24 Aug 2024 05:15) (17 - 18)  SpO2: 99% (24 Aug 2024 05:15) (96% - 99%)    Parameters below as of 24 Aug 2024 05:15  Patient On (Oxygen Delivery Method): room air        CONSTITUTIONAL: NAD, on room air  HEENT: sclera clear, MMM, eyes tracking  RESPIRATORY: Normal respiratory effort; lungs are clear to auscultation bilaterally  CARDIOVASCULAR: irregular, no murmurs appreciated; nonpitting RLE  edema  ABDOMEN: soft, Nontender, nondistended  PSYCH: calm, cooperative  NEUROLOGY: awake, alert, expressive aphasia, 0/5 strength in RLE  SKIN: No rashes; no palpable lesions    LABS:                        9.9    6.85  )-----------( 324      ( 24 Aug 2024 05:11 )             30.6     08-24    140  |  105  |  13  ----------------------------<  96  4.1   |  25  |  0.82    Ca    8.9      24 Aug 2024 05:11  Phos  3.8     08-24  Mg     2.10     08-24    TPro  7.2  /  Alb  3.5  /  TBili  0.4  /  DBili  x   /  AST  17  /  ALT  9   /  AlkPhos  92  08-23    PT/INR - ( 23 Aug 2024 11:13 )   PT: 18.9 sec;   INR: 1.71 ratio         PTT - ( 23 Aug 2024 11:13 )  PTT:37.9 sec  CARDIAC MARKERS ( 23 Aug 2024 11:13 )  x     / x     / x     / x     / 5.0 ng/mL  CARDIAC MARKERS ( 22 Aug 2024 19:34 )  x     / x     / x     / x     / 5.5 ng/mL      Urinalysis Basic - ( 24 Aug 2024 05:11 )    Color: x / Appearance: x / SG: x / pH: x  Gluc: 96 mg/dL / Ketone: x  / Bili: x / Urobili: x   Blood: x / Protein: x / Nitrite: x   Leuk Esterase: x / RBC: x / WBC x   Sq Epi: x / Non Sq Epi: x / Bacteria: x          RADIOLOGY & ADDITIONAL TESTS:  Results Reviewed:   Imaging Personally Reviewed:  Electrocardiogram Personally Reviewed:    COORDINATION OF CARE:  Care Discussed with Consultants/Other Providers [Y/N]:  Prior or Outpatient Records Reviewed [Y/N]:

## 2024-08-24 NOTE — PROGRESS NOTE ADULT - PROBLEM SELECTOR PLAN 3
- Patient with R leg non-pitting edema, does have pain in b/l legs but also with ?polyneuropathy on gabapentin  - States her RLE edema is chronic for >7 months  - No erythema, no warmth  - Dopplers of b/l LE negative for DVT, however exam was limited. Pt also reports adherence to eliquis

## 2024-08-24 NOTE — PROGRESS NOTE ADULT - SUBJECTIVE AND OBJECTIVE BOX
R HP And speech are back to her previous baseline today.  No new c/o.  No further seizures.   Vital Signs Last 24 Hrs  T(C): 36.8 (24 Aug 2024 05:15), Max: 36.8 (24 Aug 2024 05:15)  T(F): 98.3 (24 Aug 2024 05:15), Max: 98.3 (24 Aug 2024 05:15)  HR: 61 (24 Aug 2024 05:15) (61 - 74)  BP: 137/60 (24 Aug 2024 05:15) (118/74 - 137/60)  BP(mean): --  RR: 17 (24 Aug 2024 05:15) (17 - 18)  SpO2: 99% (24 Aug 2024 05:15) (96% - 99%)    Parameters below as of 24 Aug 2024 05:15  Patient On (Oxygen Delivery Method): room air      Exam:  Naming intact for most objects.  Gets wristband only from list.  Repetition is more fluent than spontaneous speech which is non-fluent with incomplete sentences and some paraphasic errors.    Flattening of the right NLF.   R HP - arm 4+ throughout.   Leg: Hip and knee with minimal movement.  Ankle PF&DF: 4+    < from: CT Angio Neck w/ IV Cont (08.22.24 @ 19:33) >  Head CT:  1. Chronic left-sided superior division MCA territory infarct.  2. No acute intracranial hemorrhage.    CTA neck and head:  1. No large vessel occlusion or major stenosis.      < end of copied text >

## 2024-08-24 NOTE — PROGRESS NOTE ADULT - PROBLEM SELECTOR PLAN 2
- Patient with elevated troponin with uptrend on repeat but CK/CKMB not significant, likely iso demand  - Denies chest pain, SOB, palpitations or dizziness/LOC currently  - EKG with TWI in V1-V2 but no significant findings of ischemia  - Cardiology following  - TTE: EF 43%, hypokinesis of mid anteroseptal, mid inferoseptal and apical septal walls, Organized fibrinous vs coagulant material is seen in the pericardial space, small pericardial effusion  - A1c 5.3, , TSH WNL, BNP elevated

## 2024-08-24 NOTE — PROGRESS NOTE ADULT - PROBLEM SELECTOR PLAN 1
- Patient with new onset likely iso prior stroke history  - Neurology following, recommendations appreciated  - check vEEG, keppra 750mg BID   - Seizure precautions, Fall and aspiration precautions  - Dysphagia screen, S&S eval: soft/bite sized

## 2024-08-24 NOTE — PATIENT PROFILE ADULT - FALL HARM RISK - HARM RISK INTERVENTIONS
Assistance with ambulation/Assistance OOB with selected safe patient handling equipment/Communicate Risk of Fall with Harm to all staff/Discuss with provider need for PT consult/Monitor gait and stability/Reinforce activity limits and safety measures with patient and family/Tailored Fall Risk Interventions/Visual Cue: Yellow wristband and red socks/Bed in lowest position, wheels locked, appropriate side rails in place/Call bell, personal items and telephone in reach/Instruct patient to call for assistance before getting out of bed or chair/Non-slip footwear when patient is out of bed/[place] to call system/Physically safe environment - no spills, clutter or unnecessary equipment/Purposeful Proactive Rounding/Room/bathroom lighting operational, light cord in reach

## 2024-08-25 LAB
ANION GAP SERPL CALC-SCNC: 10 MMOL/L — SIGNIFICANT CHANGE UP (ref 7–14)
BASOPHILS # BLD AUTO: 0.03 K/UL — SIGNIFICANT CHANGE UP (ref 0–0.2)
BASOPHILS NFR BLD AUTO: 0.5 % — SIGNIFICANT CHANGE UP (ref 0–2)
BUN SERPL-MCNC: 13 MG/DL — SIGNIFICANT CHANGE UP (ref 7–23)
CALCIUM SERPL-MCNC: 8.8 MG/DL — SIGNIFICANT CHANGE UP (ref 8.4–10.5)
CHLORIDE SERPL-SCNC: 105 MMOL/L — SIGNIFICANT CHANGE UP (ref 98–107)
CO2 SERPL-SCNC: 24 MMOL/L — SIGNIFICANT CHANGE UP (ref 22–31)
CREAT SERPL-MCNC: 0.69 MG/DL — SIGNIFICANT CHANGE UP (ref 0.5–1.3)
EGFR: 92 ML/MIN/1.73M2 — SIGNIFICANT CHANGE UP
EOSINOPHIL # BLD AUTO: 0.11 K/UL — SIGNIFICANT CHANGE UP (ref 0–0.5)
EOSINOPHIL NFR BLD AUTO: 1.8 % — SIGNIFICANT CHANGE UP (ref 0–6)
GLUCOSE SERPL-MCNC: 92 MG/DL — SIGNIFICANT CHANGE UP (ref 70–99)
HCT VFR BLD CALC: 32.1 % — LOW (ref 34.5–45)
HGB BLD-MCNC: 10.1 G/DL — LOW (ref 11.5–15.5)
IANC: 2.92 K/UL — SIGNIFICANT CHANGE UP (ref 1.8–7.4)
IMM GRANULOCYTES NFR BLD AUTO: 0.6 % — SIGNIFICANT CHANGE UP (ref 0–0.9)
LYMPHOCYTES # BLD AUTO: 2.69 K/UL — SIGNIFICANT CHANGE UP (ref 1–3.3)
LYMPHOCYTES # BLD AUTO: 42.9 % — SIGNIFICANT CHANGE UP (ref 13–44)
MAGNESIUM SERPL-MCNC: 2.1 MG/DL — SIGNIFICANT CHANGE UP (ref 1.6–2.6)
MCHC RBC-ENTMCNC: 29 PG — SIGNIFICANT CHANGE UP (ref 27–34)
MCHC RBC-ENTMCNC: 31.5 GM/DL — LOW (ref 32–36)
MCV RBC AUTO: 92.2 FL — SIGNIFICANT CHANGE UP (ref 80–100)
MONOCYTES # BLD AUTO: 0.48 K/UL — SIGNIFICANT CHANGE UP (ref 0–0.9)
MONOCYTES NFR BLD AUTO: 7.7 % — SIGNIFICANT CHANGE UP (ref 2–14)
MRSA PCR RESULT.: SIGNIFICANT CHANGE UP
NEUTROPHILS # BLD AUTO: 2.92 K/UL — SIGNIFICANT CHANGE UP (ref 1.8–7.4)
NEUTROPHILS NFR BLD AUTO: 46.5 % — SIGNIFICANT CHANGE UP (ref 43–77)
NRBC # BLD: 0 /100 WBCS — SIGNIFICANT CHANGE UP (ref 0–0)
NRBC # FLD: 0 K/UL — SIGNIFICANT CHANGE UP (ref 0–0)
PHOSPHATE SERPL-MCNC: 4.2 MG/DL — SIGNIFICANT CHANGE UP (ref 2.5–4.5)
PLATELET # BLD AUTO: 311 K/UL — SIGNIFICANT CHANGE UP (ref 150–400)
POTASSIUM SERPL-MCNC: 3.8 MMOL/L — SIGNIFICANT CHANGE UP (ref 3.5–5.3)
POTASSIUM SERPL-SCNC: 3.8 MMOL/L — SIGNIFICANT CHANGE UP (ref 3.5–5.3)
RBC # BLD: 3.48 M/UL — LOW (ref 3.8–5.2)
RBC # FLD: 14.1 % — SIGNIFICANT CHANGE UP (ref 10.3–14.5)
S AUREUS DNA NOSE QL NAA+PROBE: SIGNIFICANT CHANGE UP
SODIUM SERPL-SCNC: 139 MMOL/L — SIGNIFICANT CHANGE UP (ref 135–145)
WBC # BLD: 6.27 K/UL — SIGNIFICANT CHANGE UP (ref 3.8–10.5)
WBC # FLD AUTO: 6.27 K/UL — SIGNIFICANT CHANGE UP (ref 3.8–10.5)

## 2024-08-25 PROCEDURE — 95720 EEG PHY/QHP EA INCR W/VEEG: CPT

## 2024-08-25 PROCEDURE — 99233 SBSQ HOSP IP/OBS HIGH 50: CPT

## 2024-08-25 PROCEDURE — 93010 ELECTROCARDIOGRAM REPORT: CPT

## 2024-08-25 RX ORDER — SACUBITRIL AND VALSARTAN 49; 51 MG/1; MG/1
1 TABLET, FILM COATED ORAL
Refills: 0 | Status: DISCONTINUED | OUTPATIENT
Start: 2024-08-25 | End: 2024-08-27

## 2024-08-25 RX ADMIN — Medication 300 MILLIGRAM(S): at 21:29

## 2024-08-25 RX ADMIN — POVIDONE, PROPYLENE GLYCOL 1 DROP(S): 6.8; 3 LIQUID OPHTHALMIC at 17:14

## 2024-08-25 RX ADMIN — SACUBITRIL AND VALSARTAN 1 TABLET(S): 49; 51 TABLET, FILM COATED ORAL at 17:16

## 2024-08-25 RX ADMIN — CHLORHEXIDINE GLUCONATE 1 APPLICATION(S): 40 SOLUTION TOPICAL at 13:14

## 2024-08-25 RX ADMIN — APIXABAN 5 MILLIGRAM(S): 5 TABLET, FILM COATED ORAL at 05:36

## 2024-08-25 RX ADMIN — LEVETIRACETAM 750 MILLIGRAM(S): 1000 TABLET ORAL at 17:14

## 2024-08-25 RX ADMIN — FLUTICASONE PROPIONATE 1 SPRAY(S): 50 SPRAY, METERED NASAL at 05:37

## 2024-08-25 RX ADMIN — LEVETIRACETAM 750 MILLIGRAM(S): 1000 TABLET ORAL at 05:36

## 2024-08-25 RX ADMIN — Medication 500 MILLIGRAM(S): at 13:14

## 2024-08-25 RX ADMIN — POVIDONE, PROPYLENE GLYCOL 1 DROP(S): 6.8; 3 LIQUID OPHTHALMIC at 05:40

## 2024-08-25 RX ADMIN — FLUTICASONE PROPIONATE 1 SPRAY(S): 50 SPRAY, METERED NASAL at 17:14

## 2024-08-25 RX ADMIN — Medication 300 MILLIGRAM(S): at 13:14

## 2024-08-25 RX ADMIN — Medication 40 MILLIGRAM(S): at 21:30

## 2024-08-25 RX ADMIN — Medication 300 MILLIGRAM(S): at 05:41

## 2024-08-25 RX ADMIN — APIXABAN 5 MILLIGRAM(S): 5 TABLET, FILM COATED ORAL at 17:15

## 2024-08-25 NOTE — PROGRESS NOTE ADULT - PROBLEM SELECTOR PLAN 3
- Patient with R leg non-pitting edema, does have pain in b/l legs but also with ?polyneuropathy on gabapentin  - States her RLE edema is chronic for >7 months  - Dopplers of b/l LE negative for DVT, however exam was limited. Pt also reports adherence to eliquis

## 2024-08-25 NOTE — PROGRESS NOTE ADULT - PROBLEM SELECTOR PLAN 2
- Patient with elevated troponin with uptrend on repeat but CK/CKMB not significant, likely iso demand  - Denies chest pain, SOB, palpitations or dizziness/LOC currently  - EKG with TWI in V1-V2 but no significant findings of ischemia  - TTE: EF 43%, hypokinesis of mid anteroseptal, mid inferoseptal and apical septal walls, Organized fibrinous vs coagulant material is seen in the pericardial space, small pericardial effusion  Per cardio:  Start Entresto 24/26mg BID (if covered by insurance) otherwise can start losartan 25mg daily  - A1c 5.3, , TSH WNL, BNP elevated  - Cardiology following

## 2024-08-25 NOTE — PROGRESS NOTE ADULT - SUBJECTIVE AND OBJECTIVE BOX
Interval Events/Subjective    Patient seen and examined at bedside.  No chest pain, shortness of breath, or palpitations            Current Meds:  acetaminophen     Tablet .. 650 milliGRAM(s) Oral every 6 hours PRN  aluminum hydroxide/magnesium hydroxide/simethicone Suspension 30 milliLiter(s) Oral every 4 hours PRN  apixaban 5 milliGRAM(s) Oral every 12 hours  artificial  tears Solution 1 Drop(s) Both EYES two times a day  ascorbic acid 500 milliGRAM(s) Oral daily  atorvastatin 40 milliGRAM(s) Oral at bedtime  chlorhexidine 2% Cloths 1 Application(s) Topical daily  fluticasone propionate 50 MICROgram(s)/spray Nasal Spray 1 Spray(s) Both Nostrils two times a day  gabapentin 300 milliGRAM(s) Oral three times a day  levETIRAcetam   Injectable 750 milliGRAM(s) IV Push every 12 hours  melatonin 3 milliGRAM(s) Oral at bedtime PRN  multivitamin 1 Tablet(s) Oral daily  ondansetron Injectable 4 milliGRAM(s) IV Push every 8 hours PRN      Vitals:  T(F): 98 (08-25), Max: 99.1 (08-24)  HR: 79 (08-25) (71 - 79)  BP: 121/67 (08-25) (121/67 - 154/79)  RR: 18 (08-25)  SpO2: 99% (08-25)  I&O's Summary      Physical Exam:  GENERAL: NAD  HEAD:  Atraumatic, Normocephalic.  NECK: Supple, No JVD.  CHEST/LUNG: Clear to auscultation bilaterally; No rales, rhonchi, wheezing, or rubs. Speaking in full sentences  HEART: Regular rate and rhythm; No murmurs, rubs, or gallops.  ABDOMEN: Bowel sounds present; Soft, Nontender, Nondistended.   EXTREMITIES:  2+ Peripheral Pulses, brisk capillary refill. No clubbing, cyanosis, or edema.                          10.1   6.27  )-----------( 311      ( 25 Aug 2024 04:30 )             32.1     08-25    139  |  105  |  13  ----------------------------<  92  3.8   |  24  |  0.69    Ca    8.8      25 Aug 2024 04:30  Phos  4.2     08-25  Mg     2.10     08-25      PT/INR - ( 23 Aug 2024 11:13 )   PT: 18.9 sec;   INR: 1.71 ratio         PTT - ( 23 Aug 2024 11:13 )  PTT:37.9 sec  CARDIAC MARKERS ( 23 Aug 2024 11:13 )  x     / x     / x     / x     / x     / 5.0 ng/mL  CARDIAC MARKERS ( 23 Aug 2024 06:00 )  178 ng/L / x     / x     / x     / x     / x      CARDIAC MARKERS ( 22 Aug 2024 19:34 )  366 ng/L / x     / x     / x     / x     / 5.5 ng/mL  CARDIAC MARKERS ( 22 Aug 2024 17:34 )  330 ng/L / x     / x     / x     / x     / x        _____________________________________________________________________________________     CONCLUSIONS:      1. Left ventricular systolic function is moderately decreased with an ejection fraction of 43 % by Phipps's method of disks.   2. There is hypokinesis of the mid anteroseptal, mid inferoseptal and apical septal walls.   3. Mild left ventricular hypertrophy.   4. Normal left ventricular diastolic function.   5. Normal right ventricular cavity size and normal right ventricular systolic function.   6. No significant valvular disease.   7. Normal left and right atrial size.   8. The pericardium is thickened. Organized fibrinous vs coagulant material is seen in the pericardial space. There is a small pericardial effusion noted adjacent to the lateral left ventricle.   9. The inferior vena cava is normal in size measuring 1.77 cm in diameter, (normal <2.1cm) with normal inspiratory collapse (normal >50%) consistent with normal right atrial pressure (~3, range 0-5mmHg).    ________________________________________________________________________________________  FINDINGS:     Left Ventricle:  Left ventricular systolic function is moderately decreased. There are regional wall motion abnormalities present. Left ventricular regional wall motion assessment reveals hypokinesis of the mid anteroseptal, mid inferoseptal and apical septal walls. There is normal left ventricular diastolic function.     Right Ventricle:  The right ventricular cavity is normal in size and right ventricular systolic function is normal. Tricuspid annular plane systolic excursion (TAPSE) is 2.8 cm (normal >=1.7 cm).     Left Atrium:  The left atrium is normal in size with an indexed volume of 14.62 ml/m².     Right Atrium:  The right atrium is normal in size with an indexed area of 6.01 cm²/m².     Interatrial Septum:  The interatrial septum appears intact.     Aortic Valve:  The aortic valve is tricuspid with normal leaflet excursion. There is no aortic valve stenosis. There is no evidence of aortic regurgitation.     Mitral Valve:  Structurally normal mitral valve with normal leaflet excursion. There is no mitral valve stenosis. There is trace mitral regurgitation.     Tricuspid Valve:  Structurally normal tricuspid valve with normal leaflet excursion. There is trace tricuspid regurgitation. Estimated pulmonary artery systolic pressure is 17 mmHg.     Aorta:  The aortic root at the sinuses of Valsalva is normal in size, measuring 3.10 cm (indexed 1.66 cm/m²). The ascending aorta is normal in size, measuring 3.40 cm (indexed 1.82 cm/m²). The aortic arch diameter is normal in size, measuring 2.8 cm (indexed 1.51 cm/m²).     Pericardium:  The pericardium is thickened. Organized fibrinous vs coagulant material is seen in the pericardial space. There is a small pericardial effusion noted adjacent to the lateral left ventricle.     Systemic Veins:  The inferior vena cava is normal in size measuring 1.77 cm in diameter, (normal <2.1cm) with normal inspiratory collapse (normal >50%) consistent with normal right atrial pressure (~3, range 0-5mmHg).  ____________________________________________________________________

## 2024-08-25 NOTE — EEG REPORT - NS EEG TEXT BOX
[facility]   COMPREHENSIVE EPILEPSY CENTER   REPORT OF CONTINUOUS VIDEO EEG     [facility]: [address], [place] [address], Ph#: [phone]  [facility]: [address], [place] [address], Ph#: [phone]  Office: [address] [place] [address], [place] [address], [place] [address] Ph#: [phone]    Patient Name: LAILA JARVIS  Age and : 74y (52)  MRN #: [id number]  Location: [address]  Referring Physician: Zachariah Ceballos    Study Date:  - 24 11 am-08:00 am 21 hours    _____________________________________________________________  STUDY INFORMATION    EEG Recording Technique:  The patient underwent continuous Video-EEG monitoring, using Telemetry System hardware on the XLTek Digital System. EEG and video data were stored on a computer hard drive with important events saved in digital archive files. The material was reviewed by a physician (electroencephalographer / epileptologist) on a daily basis. Chaim and seizure detection algorithms were utilized and reviewed. An EEG Technician attended to the patient, and was available throughout daytime work hours.  The epilepsy center neurologist was available in person or on call 24-hours per day.    EEG Placement and Labeling of Electrodes:  The EEG was performed utilizing 20 channel referential EEG connections (coronal over temporal over parasagittal montage) using all standard 10-20 electrode placements with EKG, with additional electrodes placed in the inferior temporal region using the modified 10-10 montage electrode placements for elective admissions, or if deemed necessary. Recording was at a sampling rate of 256 samples per second per channel. Time synchronized digital video recording was done simultaneously with EEG recording. A low light infrared camera was used for low light recording.     _____________________________________________________________  HISTORY    Patient is a 74y old  Female who presents with a chief complaint of Seizures x2 (25 Aug 2024 12:01)      PERTINENT MEDICATION:  MEDICATIONS  (STANDING):  apixaban 5 milliGRAM(s) Oral every 12 hours  artificial  tears Solution 1 Drop(s) Both EYES two times a day  ascorbic acid 500 milliGRAM(s) Oral daily  atorvastatin 40 milliGRAM(s) Oral at bedtime  chlorhexidine 2% Cloths 1 Application(s) Topical daily  fluticasone propionate 50 MICROgram(s)/spray Nasal Spray 1 Spray(s) Both Nostrils two times a day  gabapentin 300 milliGRAM(s) Oral three times a day  levETIRAcetam   Injectable 750 milliGRAM(s) IV Push every 12 hours  multivitamin 1 Tablet(s) Oral daily  sacubitril 24 mG/valsartan 26 mG 1 Tablet(s) Oral two times a day    _____________________________________________________________  INTERPRETATION    Findings: The background was continuous, spontaneously variable and reactive. During wakefulness, the posterior dominant rhythm consisted of symmetric, well-modulated 7.5 Hz activity, with amplitude to 30 uV, that attenuated to eye opening.  Low amplitude frontal beta was noted in wakefulness.    Background Slowing:  Background predominantly consisted of theta, delta and faster activities.    Focal Slowing:   Intermittent LT slowing in theta range    Sleep Background:  Drowsiness was characterized by fragmentation, attenuation, and slowing of the background activity.    Sleep was characterized by the presence of vertex waves, symmetric sleep spindles and K-complexes.    Other Non-Epileptiform Findings:  None were present.    Interictal Epileptiform Activity:   None were present.    Events:  Clinical events: None recorded.  Seizures: None recorded.    Artifacts:  Intermittent myogenic and movement artifacts were noted.    ECG:  The heart rate on single channel ECG was predominantly between 50-60 BPM.    _____________________________________________________________  EEG SUMMARY/CLASSIFICATION    Abnormal EEG in the awake, drowsy and asleep states.  - Mild to Moderate generalized slowing.    _____________________________________________________________  EEG IMPRESSION/CLINICAL CORRELATE      Abnormal EEG study.  Functional abnormality in the left temporal region.  Mild to Moderate nonspecific diffuse cerebral dysfunction.   No epileptiform pattern or seizure seen.        Elizabeth Langston MD  Epilepsy Attending, [facility] [place]

## 2024-08-25 NOTE — PROGRESS NOTE ADULT - SUBJECTIVE AND OBJECTIVE BOX
[facility] Division of Hospital Medicine  Sophie Martinez DO  Pager (M-F, 8A-5P): [phone]      Patient is a 74y old  Female who presents with a chief complaint of Seizures x2 (25 Aug 2024 10:00)      SUBJECTIVE / OVERNIGHT EVENTS: Sinus minda to 40s overnight with PACs. Reports some lightheadedness this morning which improved after eating. No chest pain, SOB. Had BM yesterday    MEDICATIONS  (STANDING):  apixaban 5 milliGRAM(s) Oral every 12 hours  artificial  tears Solution 1 Drop(s) Both EYES two times a day  ascorbic acid 500 milliGRAM(s) Oral daily  atorvastatin 40 milliGRAM(s) Oral at bedtime  chlorhexidine 2% Cloths 1 Application(s) Topical daily  fluticasone propionate 50 MICROgram(s)/spray Nasal Spray 1 Spray(s) Both Nostrils two times a day  gabapentin 300 milliGRAM(s) Oral three times a day  levETIRAcetam   Injectable 750 milliGRAM(s) IV Push every 12 hours  multivitamin 1 Tablet(s) Oral daily    MEDICATIONS  (PRN):  acetaminophen     Tablet .. 650 milliGRAM(s) Oral every 6 hours PRN Temp greater or equal to 38C (100.4F), Mild Pain (1 - 3)  aluminum hydroxide/magnesium hydroxide/simethicone Suspension 30 milliLiter(s) Oral every 4 hours PRN Dyspepsia  melatonin 3 milliGRAM(s) Oral at bedtime PRN Insomnia  ondansetron Injectable 4 milliGRAM(s) IV Push every 8 hours PRN Nausea and/or Vomiting      CAPILLARY BLOOD GLUCOSE        I&O's Summary      PHYSICAL EXAM:  Vital Signs Last 24 Hrs  T(C): 36.7 (25 Aug 2024 05:45), Max: 37.3 (24 Aug 2024 13:09)  T(F): 98 (25 Aug 2024 05:45), Max: 99.1 (24 Aug 2024 13:09)  HR: 79 (25 Aug 2024 05:45) (71 - 79)  BP: 121/67 (25 Aug 2024 05:45) (121/67 - 154/79)  BP(mean): --  RR: 18 (25 Aug 2024 05:45) (16 - 18)  SpO2: 99% (25 Aug 2024 05:45) (99% - 100%)    Parameters below as of 25 Aug 2024 05:45  Patient On (Oxygen Delivery Method): room air    CONSTITUTIONAL: NAD, on room air, on VEEG  HEENT: sclera clear, MMM, eyes tracking  RESPIRATORY: Normal respiratory effort; lungs are clear to auscultation bilaterally  CARDIOVASCULAR: s1/s2, no murmurs appreciated; nonpitting RLE  edema  ABDOMEN: soft, Nontender, nondistended  PSYCH: calm, cooperative  NEUROLOGY: awake, alert, expressive aphasia  SKIN: No rashes; no palpable lesions    LABS:                        10.1   6.27  )-----------( 311      ( 25 Aug 2024 04:30 )             32.1     08-25    139  |  105  |  13  ----------------------------<  92  3.8   |  24  |  0.69    Ca    8.8      25 Aug 2024 04:30  Phos  4.2     08-25  Mg     2.10     08-25            Urinalysis Basic - ( 25 Aug 2024 04:30 )    Color: x / Appearance: x / SG: x / pH: x  Gluc: 92 mg/dL / Ketone: x  / Bili: x / Urobili: x   Blood: x / Protein: x / Nitrite: x   Leuk Esterase: x / RBC: x / WBC x   Sq Epi: x / Non Sq Epi: x / Bacteria: x          RADIOLOGY & ADDITIONAL TESTS:  Results Reviewed:   Imaging Personally Reviewed:  Electrocardiogram Personally Reviewed:    COORDINATION OF CARE:  Care Discussed with Consultants/Other Providers [Y/N]:  Prior or Outpatient Records Reviewed [Y/N]:

## 2024-08-25 NOTE — PROGRESS NOTE ADULT - PROBLEM SELECTOR PLAN 1
- Patient with new onset likely iso prior stroke history  - check vEEG: prelim negative so far, keppra 750mg BID   - Seizure precautions, Fall and aspiration precautions  - Dysphagia screen, S&S eval: soft/bite sized  Discussed with neuro, Dr. Barnes that if VEEG negative, then dc planning from neuro perspective

## 2024-08-26 LAB
ANION GAP SERPL CALC-SCNC: 13 MMOL/L — SIGNIFICANT CHANGE UP (ref 7–14)
BASOPHILS # BLD AUTO: 0.03 K/UL — SIGNIFICANT CHANGE UP (ref 0–0.2)
BASOPHILS NFR BLD AUTO: 0.5 % — SIGNIFICANT CHANGE UP (ref 0–2)
BUN SERPL-MCNC: 14 MG/DL — SIGNIFICANT CHANGE UP (ref 7–23)
CALCIUM SERPL-MCNC: 9.1 MG/DL — SIGNIFICANT CHANGE UP (ref 8.4–10.5)
CHLORIDE SERPL-SCNC: 106 MMOL/L — SIGNIFICANT CHANGE UP (ref 98–107)
CO2 SERPL-SCNC: 21 MMOL/L — LOW (ref 22–31)
CREAT SERPL-MCNC: 0.66 MG/DL — SIGNIFICANT CHANGE UP (ref 0.5–1.3)
EGFR: 93 ML/MIN/1.73M2 — SIGNIFICANT CHANGE UP
EOSINOPHIL # BLD AUTO: 0.09 K/UL — SIGNIFICANT CHANGE UP (ref 0–0.5)
EOSINOPHIL NFR BLD AUTO: 1.5 % — SIGNIFICANT CHANGE UP (ref 0–6)
GLUCOSE SERPL-MCNC: 96 MG/DL — SIGNIFICANT CHANGE UP (ref 70–99)
HCT VFR BLD CALC: 33.3 % — LOW (ref 34.5–45)
HGB BLD-MCNC: 10.7 G/DL — LOW (ref 11.5–15.5)
IANC: 3.3 K/UL — SIGNIFICANT CHANGE UP (ref 1.8–7.4)
IMM GRANULOCYTES NFR BLD AUTO: 0.7 % — SIGNIFICANT CHANGE UP (ref 0–0.9)
LYMPHOCYTES # BLD AUTO: 1.98 K/UL — SIGNIFICANT CHANGE UP (ref 1–3.3)
LYMPHOCYTES # BLD AUTO: 33.8 % — SIGNIFICANT CHANGE UP (ref 13–44)
MAGNESIUM SERPL-MCNC: 2.1 MG/DL — SIGNIFICANT CHANGE UP (ref 1.6–2.6)
MCHC RBC-ENTMCNC: 29.6 PG — SIGNIFICANT CHANGE UP (ref 27–34)
MCHC RBC-ENTMCNC: 32.1 GM/DL — SIGNIFICANT CHANGE UP (ref 32–36)
MCV RBC AUTO: 92 FL — SIGNIFICANT CHANGE UP (ref 80–100)
MONOCYTES # BLD AUTO: 0.42 K/UL — SIGNIFICANT CHANGE UP (ref 0–0.9)
MONOCYTES NFR BLD AUTO: 7.2 % — SIGNIFICANT CHANGE UP (ref 2–14)
NEUTROPHILS # BLD AUTO: 3.3 K/UL — SIGNIFICANT CHANGE UP (ref 1.8–7.4)
NEUTROPHILS NFR BLD AUTO: 56.3 % — SIGNIFICANT CHANGE UP (ref 43–77)
NRBC # BLD: 0 /100 WBCS — SIGNIFICANT CHANGE UP (ref 0–0)
NRBC # FLD: 0 K/UL — SIGNIFICANT CHANGE UP (ref 0–0)
PHOSPHATE SERPL-MCNC: 3.9 MG/DL — SIGNIFICANT CHANGE UP (ref 2.5–4.5)
PLATELET # BLD AUTO: 343 K/UL — SIGNIFICANT CHANGE UP (ref 150–400)
POTASSIUM SERPL-MCNC: 4 MMOL/L — SIGNIFICANT CHANGE UP (ref 3.5–5.3)
POTASSIUM SERPL-SCNC: 4 MMOL/L — SIGNIFICANT CHANGE UP (ref 3.5–5.3)
RBC # BLD: 3.62 M/UL — LOW (ref 3.8–5.2)
RBC # FLD: 14 % — SIGNIFICANT CHANGE UP (ref 10.3–14.5)
SODIUM SERPL-SCNC: 140 MMOL/L — SIGNIFICANT CHANGE UP (ref 135–145)
WBC # BLD: 5.86 K/UL — SIGNIFICANT CHANGE UP (ref 3.8–10.5)
WBC # FLD AUTO: 5.86 K/UL — SIGNIFICANT CHANGE UP (ref 3.8–10.5)

## 2024-08-26 PROCEDURE — 95718 EEG PHYS/QHP 2-12 HR W/VEEG: CPT

## 2024-08-26 PROCEDURE — 99233 SBSQ HOSP IP/OBS HIGH 50: CPT

## 2024-08-26 RX ORDER — LEVETIRACETAM 1000 MG/1
1000 TABLET ORAL
Refills: 0 | Status: DISCONTINUED | OUTPATIENT
Start: 2024-08-26 | End: 2024-08-27

## 2024-08-26 RX ADMIN — FLUTICASONE PROPIONATE 1 SPRAY(S): 50 SPRAY, METERED NASAL at 17:51

## 2024-08-26 RX ADMIN — POVIDONE, PROPYLENE GLYCOL 1 DROP(S): 6.8; 3 LIQUID OPHTHALMIC at 17:51

## 2024-08-26 RX ADMIN — Medication 300 MILLIGRAM(S): at 05:17

## 2024-08-26 RX ADMIN — Medication 300 MILLIGRAM(S): at 13:52

## 2024-08-26 RX ADMIN — Medication 300 MILLIGRAM(S): at 21:22

## 2024-08-26 RX ADMIN — CHLORHEXIDINE GLUCONATE 1 APPLICATION(S): 40 SOLUTION TOPICAL at 13:14

## 2024-08-26 RX ADMIN — Medication 500 MILLIGRAM(S): at 13:11

## 2024-08-26 RX ADMIN — APIXABAN 5 MILLIGRAM(S): 5 TABLET, FILM COATED ORAL at 17:51

## 2024-08-26 RX ADMIN — Medication 1 TABLET(S): at 13:11

## 2024-08-26 RX ADMIN — LEVETIRACETAM 1000 MILLIGRAM(S): 1000 TABLET ORAL at 18:36

## 2024-08-26 RX ADMIN — Medication 40 MILLIGRAM(S): at 21:23

## 2024-08-26 RX ADMIN — SACUBITRIL AND VALSARTAN 1 TABLET(S): 49; 51 TABLET, FILM COATED ORAL at 05:17

## 2024-08-26 RX ADMIN — APIXABAN 5 MILLIGRAM(S): 5 TABLET, FILM COATED ORAL at 05:17

## 2024-08-26 RX ADMIN — SACUBITRIL AND VALSARTAN 1 TABLET(S): 49; 51 TABLET, FILM COATED ORAL at 17:51

## 2024-08-26 RX ADMIN — LEVETIRACETAM 750 MILLIGRAM(S): 1000 TABLET ORAL at 05:25

## 2024-08-26 NOTE — PROGRESS NOTE ADULT - ASSESSMENT
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 Her TTE demonstrated mildly reduced LV systolic function with evidence of regional wall motion abnormalities.     1. Troponin elevation  - Possibly secondary to seizure activity,   - Benign ECG with no reported chest pain or angina symptoms  - Low suspicion of ACS    2. MIldly reduced LV systolic function   - EF 43% with hypokinesis of the mid anteroseptal, mid inferoseptal and apical septal walls.   - Unsure of chronicity would obtain outpatient TTE report  - In the mean time would favor medical management  - Start Entresto 24/26mg BID (if covered by insurance) otherwise can start losartan 25mg daily  - Will monitor BP response and consider beta-blocker initiation.     3. P. Afib  - Eliquis 5mg BID    Recommendations are preliminary until attending attestation.    Amrita Moss MD  Cardiology Fellow
Ms. Bond is a 75 yo woman with new onset seizure - focal onset to B tonic clonic seizure.  Focal epilepsy.  Etiology is structural - old infarct - deficits back to baseline per patient.   Continuous EEG  Continue levetiracetam 750 mg BID   Continue anticoagulation (H/O A.fib), statin and antihypertensive medication for stroke risk reduction.   D/W patient.   Thank you. 
73 y/o F with history of CVA with residual R sided weakness and expressive aphasia, pAFib on DOAC, HTN, HLD, CAD, and OA presents to the hospital with new onset seizures x2. 
75 y/o F with history of CVA with residual R sided weakness and expressive aphasia, pAFib on DOAC, HTN, HLD, CAD, and OA presents to the hospital with new onset seizures x2.

## 2024-08-26 NOTE — PROGRESS NOTE ADULT - PROBLEM SELECTOR PLAN 8
DVT ppx - On eliquis  Diet - soft and bite sized diet  PT recs: ADILIA, plan to return to [facility]    Discussed with Sister Sis at bedside at length today 8/26, anticipate return to [facility] tomorrow.
DVT ppx - On eliquis  Diet - soft and bite sized diet  Activity - OOB with assistance, increase as tolerated  PT recs: ADILIA    Fall and aspiration precautions
- c/w atorvastatin
DVT ppx - On eliquis  Diet - soft and bite sized diet  Activity - OOB with assistance, increase as tolerated  PT recs: ADILIA    Fall and aspiration precautions

## 2024-08-26 NOTE — CHART NOTE - NSCHARTNOTEFT_GEN_A_CORE
EEG report reviewed (see below), imaging reviewed, and case discussed with neurology attending Dr. Kelly.     Recommend increasing Keppra to 1g BID. No further inpatient neurologic workup required at this time, including no need for further EEG monitoring. After discharge, pt should pursue outpatient epilepsy follow up at [address] [place] [address].      ------------------  EEG Report:  FINDINGS:      Background:  Continuity: Continuous  Symmetry: Symmetric  Posterior dominant rhythm (PDR): 9 Hz, reactive to eye closure. Symmetric low-amplitude frontal beta in wakefulness.  Voltage: Normal  Anterior-Posterior Gradient: Present  Other background findings: None  Breach: Absent    Background Slowing:  Generalized slowing: None  Focal slowing: Occasional left temporal focal polymorphic delta and theta slowing.    State Changes:   Drowsiness is characterized by fragmentation, attenuation, and slowing of the background activity.  Stage 2 sleep is characterized by symmetric K complexes and sleep spindles. Sleep spindles are at times asynchronous.    Interictal Findings:  None    Electrographic and Electroclinical seizures:  None    Other Clinical Events:  None    Activation Procedures:   Hyperventilation is not performed.    Photic stimulation is not performed.    Artifacts:  Intermittent myogenic and movement artifacts are present.    Single-lead EKG: Irregular rhythm at times    EEG Classification / Summary:  Abnormal EEG in the awake, drowsy, and asleep states.   -Occasional left temporal focal slowing  -No epileptiform abnormalities or seizures.    Clinical Impression:  -Left temporal focal cerebral dysfunction can be structural or functional in etiology.   -No epileptiform abnormalities or seizures in 2 days of recording.  -Single-lead EKG: Irregular rhythm at times
SW met with patient and sister, Sis at bedside. Sis had patient's other sister, Milagros on speaker. SW explained to the family the process of patient returning to [facility]. Family thanked this  for providing this information. SW to remain available throughout admissions.
EEG preliminary read (not final) on the initial recording hour(s) = 2.5 hr    No epileptiform abnormalities or seizures.  Muscle artifact limits interpretation.      Final report to follow tomorrow morning after completion of study.    [facility] EEG Reading Room Ph#: [phone]  Epilepsy Answering Service after 5PM and before 8:30AM: Ph#: [phone]
Updated sisterSis :  [phone] over phone on course and plan. Sister states she has power of /HCP and states patient is DNR/DNI. She reports giving paperwork to . Sis states there are 4 sisters including patient and they are aware of her comorbidities and that her sister has been through significant ups and downs in the last several months. She states that her and her other sisters anticipate further decline in future.    All questions answered. Updated LEELEE Partida regarding conversation and for paperwork.

## 2024-08-26 NOTE — PROGRESS NOTE ADULT - TIME BILLING
- Ordering, reviewing, and interpreting labs, testing, and imaging  - Independently obtaining a review of systems and performing a physical exam  - Reviewing consultant documentation/recommendations  - Counselling and educating patient and family regarding interpretation of aforementioned items and plan of care  - Documentation of encounter
Time spent includes direct patient care  (interview and examination of patient), discussion with other providers, support staff and/or patient's family members, review of medical records, ordering diagnostic tests and analyzing results, and documentation. Time spent was exclusive of teaching residents.
Time spent on review of vitals, physical exam, documentation, and discussion of plan of care with patient and/or patient's family.
Time spent includes direct patient care  (interview and examination of patient), discussion with other providers, support staff and/or patient's family members, review of medical records, ordering diagnostic tests and analyzing results, and documentation. Time spent was exclusive of teaching residents.

## 2024-08-26 NOTE — EEG REPORT - NS EEG TEXT BOX
LAILA JARVIS [id number]     Study Date: 8/25/24 08:00 - 8/26/24 08:00  Duration: 24 hr  --------------------------------------------------------------------------------------------------  History:  CC/ HPI Patient is a 74y old  Female who presents with a chief complaint of Seizures x2 (25 Aug 2024 12:01)    MEDICATIONS  (STANDING):  apixaban 5 milliGRAM(s) Oral every 12 hours  artificial  tears Solution 1 Drop(s) Both EYES two times a day  ascorbic acid 500 milliGRAM(s) Oral daily  atorvastatin 40 milliGRAM(s) Oral at bedtime  chlorhexidine 2% Cloths 1 Application(s) Topical daily  fluticasone propionate 50 MICROgram(s)/spray Nasal Spray 1 Spray(s) Both Nostrils two times a day  gabapentin 300 milliGRAM(s) Oral three times a day  levETIRAcetam   Injectable 750 milliGRAM(s) IV Push every 12 hours  multivitamin 1 Tablet(s) Oral daily  sacubitril 24 mG/valsartan 26 mG 1 Tablet(s) Oral two times a day    --------------------------------------------------------------------------------------------------  Study Interpretation:    [[[Abbreviation Key:  PDR=alpha rhythm/posterior dominant rhythm. A-P=anterior posterior.  Amplitude: ‘very low’:<20; ‘low’:20-49; ‘medium’:; ‘high’:>150uV.  Persistence for periodic/rhythmic patterns (% of epoch) ‘rare’:<1%; ‘occasional’:1-10%; ‘frequent’:10-50%; ‘abundant’:50-90%; ‘continuous’:>90%.  Persistence for sporadic discharges: ‘rare’:<1/hr; ‘occasional’:1/min-1/hr; ‘frequent’:>1/min; ‘abundant’:>1/10 sec.  RPP=rhythmic and periodic patterns; GRDA=generalized rhythmic delta activity; FIRDA=frontal intermittent GRDA; LRDA=lateralized rhythmic delta activity; TIRDA=temporal intermittent rhythmic delta activity;  LPD=PLED=lateralized periodic discharges; GPD=generalized periodic discharges; BIPDs =bilateral independent periodic discharges; Mf=multifocal; SIRPDs=stimulus induced rhythmic, periodic, or ictal appearing discharges; BIRDs=brief potentially ictal rhythmic discharges >4 Hz, lasting .5-10s; PFA (paroxysmal bursts >13 Hz or =8 Hz <10s).  Modifiers: +F=with fast component; +S=with spike component; +R=with rhythmic component.  S-B=burst suppression pattern.  Max=maximal. N1-drowsy; N2-stage II sleep; N3-slow wave sleep. SSS/BETS=small sharp spikes/benign epileptiform transients of sleep. HV=hyperventilation; PS=photic stimulation]]]    Daily EEG Visual Analysis    FINDINGS:      Background:  Continuity: Continuous  Symmetry: Symmetric  Posterior dominant rhythm (PDR): 9 Hz, reactive to eye closure. Symmetric low-amplitude frontal beta in wakefulness.  Voltage: Normal  Anterior-Posterior Gradient: Present  Other background findings: None  Breach: Absent    Background Slowing:  Generalized slowing: None  Focal slowing: Occasional left temporal focal polymorphic delta and theta slowing.    State Changes:   Drowsiness is characterized by fragmentation, attenuation, and slowing of the background activity.  Stage 2 sleep is characterized by symmetric K complexes and sleep spindles. Sleep spindles are at times asynchronous.    Interictal Findings:  None    Electrographic and Electroclinical seizures:  None    Other Clinical Events:  None    Activation Procedures:   Hyperventilation is not performed.    Photic stimulation is not performed.    Artifacts:  Intermittent myogenic and movement artifacts are present.    Single-lead EKG: Irregular rhythm at times    EEG Classification / Summary:  Abnormal EEG in the awake, drowsy, and asleep states.   -Occasional left temporal focal slowing  -No epileptiform abnormalities or seizures.    Clinical Impression:  -Left temporal focal cerebral dysfunction can be structural or functional in etiology.   -No epileptiform abnormalities or seizures.  -Single-lead EKG: Irregular rhythm at times        -------------------------------------------------------------------------------------------------------    Indira Ledbetter MD  Attending Physician, [facility] Epilepsy [place]    -------------------------------------------------------------------------------------------------------    To reach EEG technologist:  Please use the pager number for the appropriate hospital or contact the .  At [facility] - Pager #: [phone]    To reach EEG-reading physician:  [facility] EEG Reading Room Phone #: [phone]  Epilepsy Answering Service after 5PM and before 8:30AM: Phone #: [phone]     LAILA JARVIS [id number]     Study Date: 8/25/24 08:00 - 8/26/24 08:00  Duration: 24 hr  --------------------------------------------------------------------------------------------------  History:  CC/ HPI Patient is a 74y old  Female who presents with a chief complaint of Seizures x2 (25 Aug 2024 12:01)    MEDICATIONS  (STANDING):  apixaban 5 milliGRAM(s) Oral every 12 hours  artificial  tears Solution 1 Drop(s) Both EYES two times a day  ascorbic acid 500 milliGRAM(s) Oral daily  atorvastatin 40 milliGRAM(s) Oral at bedtime  chlorhexidine 2% Cloths 1 Application(s) Topical daily  fluticasone propionate 50 MICROgram(s)/spray Nasal Spray 1 Spray(s) Both Nostrils two times a day  gabapentin 300 milliGRAM(s) Oral three times a day  levETIRAcetam   Injectable 750 milliGRAM(s) IV Push every 12 hours  multivitamin 1 Tablet(s) Oral daily  sacubitril 24 mG/valsartan 26 mG 1 Tablet(s) Oral two times a day    --------------------------------------------------------------------------------------------------  Study Interpretation:    [[[Abbreviation Key:  PDR=alpha rhythm/posterior dominant rhythm. A-P=anterior posterior.  Amplitude: ‘very low’:<20; ‘low’:20-49; ‘medium’:; ‘high’:>150uV.  Persistence for periodic/rhythmic patterns (% of epoch) ‘rare’:<1%; ‘occasional’:1-10%; ‘frequent’:10-50%; ‘abundant’:50-90%; ‘continuous’:>90%.  Persistence for sporadic discharges: ‘rare’:<1/hr; ‘occasional’:1/min-1/hr; ‘frequent’:>1/min; ‘abundant’:>1/10 sec.  RPP=rhythmic and periodic patterns; GRDA=generalized rhythmic delta activity; FIRDA=frontal intermittent GRDA; LRDA=lateralized rhythmic delta activity; TIRDA=temporal intermittent rhythmic delta activity;  LPD=PLED=lateralized periodic discharges; GPD=generalized periodic discharges; BIPDs =bilateral independent periodic discharges; Mf=multifocal; SIRPDs=stimulus induced rhythmic, periodic, or ictal appearing discharges; BIRDs=brief potentially ictal rhythmic discharges >4 Hz, lasting .5-10s; PFA (paroxysmal bursts >13 Hz or =8 Hz <10s).  Modifiers: +F=with fast component; +S=with spike component; +R=with rhythmic component.  S-B=burst suppression pattern.  Max=maximal. N1-drowsy; N2-stage II sleep; N3-slow wave sleep. SSS/BETS=small sharp spikes/benign epileptiform transients of sleep. HV=hyperventilation; PS=photic stimulation]]]    Daily EEG Visual Analysis    FINDINGS:      Background:  Continuity: Continuous  Symmetry: Symmetric  Posterior dominant rhythm (PDR): 9 Hz, reactive to eye closure. Symmetric low-amplitude frontal beta in wakefulness.  Voltage: Normal  Anterior-Posterior Gradient: Present  Other background findings: None  Breach: Absent    Background Slowing:  Generalized slowing: None  Focal slowing: Occasional left temporal focal polymorphic delta and theta slowing.    State Changes:   Drowsiness is characterized by fragmentation, attenuation, and slowing of the background activity.  Stage 2 sleep is characterized by symmetric K complexes and sleep spindles. Sleep spindles are at times asynchronous.    Interictal Findings:  None    Electrographic and Electroclinical seizures:  None    Other Clinical Events:  None    Activation Procedures:   Hyperventilation is not performed.    Photic stimulation is not performed.    Artifacts:  Intermittent myogenic and movement artifacts are present.    Single-lead EKG: Irregular rhythm at times    EEG Classification / Summary:  Abnormal EEG in the awake, drowsy, and asleep states.   -Occasional left temporal focal slowing  -No epileptiform abnormalities or seizures.    Clinical Impression:  -Left temporal focal cerebral dysfunction can be structural or functional in etiology.   -No epileptiform abnormalities or seizures in 2 days of recording.  -Single-lead EKG: Irregular rhythm at times        -------------------------------------------------------------------------------------------------------    Indira Ledbetter MD  Attending Physician, [facility] Epilepsy [place]    -------------------------------------------------------------------------------------------------------    To reach EEG technologist:  Please use the pager number for the appropriate hospital or contact the .  At [facility] - Pager #: [phone]    To reach EEG-reading physician:  [facility] EEG Reading Room Phone #: [phone]  Epilepsy Answering Service after 5PM and before 8:30AM: Phone #: [phone]     LAILA JARVIS [id number]     Study Date: 8/25/24 08:00 - 8/26/24 11:28  Duration: 27 hr 24 min  --------------------------------------------------------------------------------------------------  History:  CC/ HPI Patient is a 74y old  Female who presents with a chief complaint of Seizures x2 (25 Aug 2024 12:01)    MEDICATIONS  (STANDING):  apixaban 5 milliGRAM(s) Oral every 12 hours  artificial  tears Solution 1 Drop(s) Both EYES two times a day  ascorbic acid 500 milliGRAM(s) Oral daily  atorvastatin 40 milliGRAM(s) Oral at bedtime  chlorhexidine 2% Cloths 1 Application(s) Topical daily  fluticasone propionate 50 MICROgram(s)/spray Nasal Spray 1 Spray(s) Both Nostrils two times a day  gabapentin 300 milliGRAM(s) Oral three times a day  levETIRAcetam   Injectable 750 milliGRAM(s) IV Push every 12 hours  multivitamin 1 Tablet(s) Oral daily  sacubitril 24 mG/valsartan 26 mG 1 Tablet(s) Oral two times a day    --------------------------------------------------------------------------------------------------  Study Interpretation:    [[[Abbreviation Key:  PDR=alpha rhythm/posterior dominant rhythm. A-P=anterior posterior.  Amplitude: ‘very low’:<20; ‘low’:20-49; ‘medium’:; ‘high’:>150uV.  Persistence for periodic/rhythmic patterns (% of epoch) ‘rare’:<1%; ‘occasional’:1-10%; ‘frequent’:10-50%; ‘abundant’:50-90%; ‘continuous’:>90%.  Persistence for sporadic discharges: ‘rare’:<1/hr; ‘occasional’:1/min-1/hr; ‘frequent’:>1/min; ‘abundant’:>1/10 sec.  RPP=rhythmic and periodic patterns; GRDA=generalized rhythmic delta activity; FIRDA=frontal intermittent GRDA; LRDA=lateralized rhythmic delta activity; TIRDA=temporal intermittent rhythmic delta activity;  LPD=PLED=lateralized periodic discharges; GPD=generalized periodic discharges; BIPDs =bilateral independent periodic discharges; Mf=multifocal; SIRPDs=stimulus induced rhythmic, periodic, or ictal appearing discharges; BIRDs=brief potentially ictal rhythmic discharges >4 Hz, lasting .5-10s; PFA (paroxysmal bursts >13 Hz or =8 Hz <10s).  Modifiers: +F=with fast component; +S=with spike component; +R=with rhythmic component.  S-B=burst suppression pattern.  Max=maximal. N1-drowsy; N2-stage II sleep; N3-slow wave sleep. SSS/BETS=small sharp spikes/benign epileptiform transients of sleep. HV=hyperventilation; PS=photic stimulation]]]    Daily EEG Visual Analysis    FINDINGS:      Background:  Continuity: Continuous  Symmetry: Symmetric  Posterior dominant rhythm (PDR): 9 Hz, reactive to eye closure. Symmetric low-amplitude frontal beta in wakefulness.  Voltage: Normal  Anterior-Posterior Gradient: Present  Other background findings: None  Breach: Absent    Background Slowing:  Generalized slowing: None  Focal slowing: Occasional left temporal focal polymorphic delta and theta slowing.    State Changes:   Drowsiness is characterized by fragmentation, attenuation, and slowing of the background activity.  Stage 2 sleep is characterized by symmetric K complexes and sleep spindles. Sleep spindles are at times asynchronous.    Interictal Findings:  None    Electrographic and Electroclinical seizures:  None    Other Clinical Events:  None    Activation Procedures:   Hyperventilation is not performed.    Photic stimulation is not performed.    Artifacts:  Intermittent myogenic and movement artifacts are present.    Single-lead EKG: Irregular rhythm at times    EEG Classification / Summary:  Abnormal EEG in the awake, drowsy, and asleep states.   -Occasional left temporal focal slowing  -No epileptiform abnormalities or seizures.    Clinical Impression:  -Left temporal focal cerebral dysfunction can be structural or functional in etiology.   -No epileptiform abnormalities or seizures in 2 days of recording.  -Single-lead EKG: Irregular rhythm at times        -------------------------------------------------------------------------------------------------------    Indira Ledbetter MD  Attending Physician, [facility]    -------------------------------------------------------------------------------------------------------    To reach EEG technologist:  Please use the pager number for the appropriate hospital or contact the .  At [facility] - Pager #: [phone]    To reach EEG-reading physician:  [facility] EEG Reading Room Phone #: [phone]  Epilepsy Answering Service after 5PM and before 8:30AM: Phone #: [phone]

## 2024-08-26 NOTE — PROVIDER CONTACT NOTE (CHANGE IN STATUS NOTIFICATION) - ASSESSMENT
Patient alert and orientedx4. Denies chest pain or SOB. No complaints. Vital signs within normal limits- temp 97.7, HR 60s-70s, /62, spO2 100 on room air.

## 2024-08-26 NOTE — PROGRESS NOTE ADULT - PROBLEM SELECTOR PROBLEM 8
Need for prophylactic measure
HLD (hyperlipidemia)

## 2024-08-26 NOTE — PROGRESS NOTE ADULT - PROBLEM SELECTOR PLAN 1
Patient with new onset likely iso prior stroke history  - vEEG w/ -Left temporal focal cerebral dysfunction (can be structural or functional in etiology). No epileptiform abnormalities or seizures in 2 days of recording  - Apprec Neurology recs, increasing Keppra to 1g BID  - No further inpatient neurologic workup required at this time, including no need for further EEG monitoring  - Outpatient epilepsy follow up at [address] [place] [address]  - Seizure precautions, Fall and aspiration precautions  - Dysphagia screen, S&S eval: soft/bite sized

## 2024-08-26 NOTE — PROGRESS NOTE ADULT - PROBLEM SELECTOR PLAN 3
Patient with R leg non-pitting edema, does have pain in b/l legs but also with ?polyneuropathy on gabapentin  - States her RLE edema is chronic for >7 months  - Dopplers of b/l LE negative for DVT, however exam was limited. Pt also reports adherence to eliquis

## 2024-08-26 NOTE — PROGRESS NOTE ADULT - SUBJECTIVE AND OBJECTIVE BOX
Patient is a 74y old  Female who presents with a chief complaint of Seizures x2 (25 Aug 2024 12:01)      INTERVAL HPI/OVERNIGHT EVENTS:  Seen by me this afternoon, sister Sis at bedside, doing better, c/o joint pain (chronic), aphasic. Tolerating PO.    Review of Systems: 12 point review of systems otherwise negative    MEDICATIONS  (STANDING):  apixaban 5 milliGRAM(s) Oral every 12 hours  artificial  tears Solution 1 Drop(s) Both EYES two times a day  ascorbic acid 500 milliGRAM(s) Oral daily  atorvastatin 40 milliGRAM(s) Oral at bedtime  chlorhexidine 2% Cloths 1 Application(s) Topical daily  fluticasone propionate 50 MICROgram(s)/spray Nasal Spray 1 Spray(s) Both Nostrils two times a day  gabapentin 300 milliGRAM(s) Oral three times a day  levETIRAcetam 1000 milliGRAM(s) Oral two times a day  multivitamin 1 Tablet(s) Oral daily  sacubitril 24 mG/valsartan 26 mG 1 Tablet(s) Oral two times a day    MEDICATIONS  (PRN):  acetaminophen     Tablet .. 650 milliGRAM(s) Oral every 6 hours PRN Temp greater or equal to 38C (100.4F), Mild Pain (1 - 3)  aluminum hydroxide/magnesium hydroxide/simethicone Suspension 30 milliLiter(s) Oral every 4 hours PRN Dyspepsia  melatonin 3 milliGRAM(s) Oral at bedtime PRN Insomnia  ondansetron Injectable 4 milliGRAM(s) IV Push every 8 hours PRN Nausea and/or Vomiting      Allergies    penicillin (Short breath)    Intolerances          Vital Signs Last 24 Hrs  T(C): 36.7 (26 Aug 2024 17:45), Max: 37.2 (25 Aug 2024 21:10)  T(F): 98.1 (26 Aug 2024 17:45), Max: 98.9 (25 Aug 2024 21:10)  HR: 60 (26 Aug 2024 17:45) (60 - 72)  BP: 124/74 (26 Aug 2024 17:45) (113/62 - 128/70)  BP(mean): --  RR: 14 (26 Aug 2024 17:45) (14 - 19)  SpO2: 100% (26 Aug 2024 17:45) (98% - 100%)    Parameters below as of 26 Aug 2024 17:45  Patient On (Oxygen Delivery Method): room air      CAPILLARY BLOOD GLUCOSE          08-25 @ 07:01  -  08-26 @ 07:00  --------------------------------------------------------  IN: 0 mL / OUT: 2400 mL / NET: -2400 mL        Physical Exam:    Daily     Daily   General:  Well appearing, NAD, not cachetic, +expressive aphasia  HEENT:  Nonicteric, PERRLA  CV:  RRR, no murmur, no JVD  Lungs:  CTA B/L, no wheezes, rales, rhonchi  Abdomen:  Soft, non-tender, no distended, positive BS, no hepatosplenomegaly  Extremities:  2+ pulses, no c/c, no edema  Skin:  Warm and dry, no rashes  :  No bennett  Neuro: Alert/awake, +expressive aphasia, oriented x3  No Restraints    LABS:                        10.7   5.86  )-----------( 343      ( 26 Aug 2024 04:45 )             33.3     08-26    140  |  106  |  14  ----------------------------<  96  4.0   |  21<L>  |  0.66    Ca    9.1      26 Aug 2024 04:45  Phos  3.9     08-26  Mg     2.10     08-26        Urinalysis Basic - ( 26 Aug 2024 04:45 )    Color: x / Appearance: x / SG: x / pH: x  Gluc: 96 mg/dL / Ketone: x  / Bili: x / Urobili: x   Blood: x / Protein: x / Nitrite: x   Leuk Esterase: x / RBC: x / WBC x   Sq Epi: x / Non Sq Epi: x / Bacteria: x          RADIOLOGY & ADDITIONAL TESTS:  Reviewed by me

## 2024-08-26 NOTE — PROGRESS NOTE ADULT - PROBLEM SELECTOR PLAN 2
Patient with elevated troponin with uptrend on repeat but CK/CKMB not significant, likely iso demand  - Denies chest pain, SOB, palpitations or dizziness/LOC currently  - EKG with TWI in V1-V2 but no significant findings of ischemia  - TTE: EF 43%, hypokinesis of mid anteroseptal, mid inferoseptal and apical septal walls, Organized fibrinous vs coagulant material is seen in the pericardial space, small pericardial effusion  - Per cardio: Start Entresto 24/26mg BID  - A1c 5.3, , TSH WNL  - Cardiology following, apprec recs

## 2024-08-26 NOTE — PROGRESS NOTE ADULT - PROBLEM SELECTOR PROBLEM 5
History of CVA in adulthood
Paroxysmal atrial fibrillation

## 2024-08-27 ENCOUNTER — TRANSCRIPTION ENCOUNTER (OUTPATIENT)
Age: 74
End: 2024-08-27

## 2024-08-27 VITALS
TEMPERATURE: 99 F | RESPIRATION RATE: 16 BRPM | DIASTOLIC BLOOD PRESSURE: 65 MMHG | SYSTOLIC BLOOD PRESSURE: 138 MMHG | OXYGEN SATURATION: 100 %

## 2024-08-27 PROCEDURE — 99239 HOSP IP/OBS DSCHRG MGMT >30: CPT

## 2024-08-27 RX ORDER — GABAPENTIN 100 MG
1 CAPSULE ORAL
Refills: 0 | DISCHARGE

## 2024-08-27 RX ORDER — ACETAMINOPHEN 325 MG/1
2 TABLET ORAL
Refills: 0 | DISCHARGE

## 2024-08-27 RX ORDER — ASCORBIC ACID/ASCORBATE SODIUM 500 MG
1 TABLET,CHEWABLE ORAL
Refills: 0 | DISCHARGE

## 2024-08-27 RX ORDER — LIDOCAINE/BENZALKONIUM/ALCOHOL
1 SOLUTION, NON-ORAL TOPICAL
Refills: 0 | DISCHARGE

## 2024-08-27 RX ORDER — FLUTICASONE PROPIONATE 50 UG/1
1 SPRAY, METERED NASAL
Refills: 0 | DISCHARGE

## 2024-08-27 RX ORDER — FLUTICASONE PROPIONATE 50 UG/1
1 SPRAY, METERED NASAL
Qty: 0 | Refills: 0 | DISCHARGE
Start: 2024-08-27

## 2024-08-27 RX ORDER — GABAPENTIN 100 MG
1 CAPSULE ORAL
Qty: 0 | Refills: 0 | DISCHARGE
Start: 2024-08-27

## 2024-08-27 RX ORDER — ASCORBIC ACID/ASCORBATE SODIUM 500 MG
1 TABLET,CHEWABLE ORAL
Qty: 0 | Refills: 0 | DISCHARGE
Start: 2024-08-27

## 2024-08-27 RX ORDER — LEVETIRACETAM 1000 MG/1
1 TABLET ORAL
Qty: 0 | Refills: 0 | DISCHARGE
Start: 2024-08-27

## 2024-08-27 RX ORDER — POVIDONE, PROPYLENE GLYCOL 6.8; 3 MG/ML; MG/ML
1 LIQUID OPHTHALMIC
Refills: 0 | DISCHARGE

## 2024-08-27 RX ORDER — ACETAMINOPHEN 325 MG/1
2 TABLET ORAL
Qty: 0 | Refills: 0 | DISCHARGE
Start: 2024-08-27

## 2024-08-27 RX ORDER — SACUBITRIL AND VALSARTAN 49; 51 MG/1; MG/1
1 TABLET, FILM COATED ORAL
Qty: 0 | Refills: 0 | DISCHARGE
Start: 2024-08-27

## 2024-08-27 RX ORDER — MAGNESIUM, ALUMINUM HYDROXIDE 200-225/5
30 SUSPENSION, ORAL (FINAL DOSE FORM) ORAL
Qty: 0 | Refills: 0 | DISCHARGE
Start: 2024-08-27

## 2024-08-27 RX ORDER — POVIDONE, PROPYLENE GLYCOL 6.8; 3 MG/ML; MG/ML
1 LIQUID OPHTHALMIC
Qty: 0 | Refills: 0 | DISCHARGE
Start: 2024-08-27

## 2024-08-27 RX ADMIN — ACETAMINOPHEN 650 MILLIGRAM(S): 325 TABLET ORAL at 05:33

## 2024-08-27 RX ADMIN — SACUBITRIL AND VALSARTAN 1 TABLET(S): 49; 51 TABLET, FILM COATED ORAL at 17:13

## 2024-08-27 RX ADMIN — Medication 300 MILLIGRAM(S): at 05:28

## 2024-08-27 RX ADMIN — APIXABAN 5 MILLIGRAM(S): 5 TABLET, FILM COATED ORAL at 17:13

## 2024-08-27 RX ADMIN — Medication 1 TABLET(S): at 11:36

## 2024-08-27 RX ADMIN — SACUBITRIL AND VALSARTAN 1 TABLET(S): 49; 51 TABLET, FILM COATED ORAL at 05:29

## 2024-08-27 RX ADMIN — LEVETIRACETAM 1000 MILLIGRAM(S): 1000 TABLET ORAL at 05:29

## 2024-08-27 RX ADMIN — POVIDONE, PROPYLENE GLYCOL 1 DROP(S): 6.8; 3 LIQUID OPHTHALMIC at 17:12

## 2024-08-27 RX ADMIN — LEVETIRACETAM 1000 MILLIGRAM(S): 1000 TABLET ORAL at 17:13

## 2024-08-27 RX ADMIN — Medication 500 MILLIGRAM(S): at 11:36

## 2024-08-27 RX ADMIN — FLUTICASONE PROPIONATE 1 SPRAY(S): 50 SPRAY, METERED NASAL at 17:13

## 2024-08-27 RX ADMIN — Medication 300 MILLIGRAM(S): at 12:28

## 2024-08-27 RX ADMIN — CHLORHEXIDINE GLUCONATE 1 APPLICATION(S): 40 SOLUTION TOPICAL at 11:37

## 2024-08-27 RX ADMIN — APIXABAN 5 MILLIGRAM(S): 5 TABLET, FILM COATED ORAL at 05:29

## 2024-08-27 NOTE — ADVANCED PRACTICE NURSE CONSULT - RECOMMEDATIONS
Topical recommendations:     Left heel- Apply LBF daily, continue to offload.     Continue low air loss bed therapy,  heel elevation with offloading boots, turn & reposition q2h with fluidized pillow, continue moisture management with barrier creams as specified above & single breathable pad, continue measures to decrease friction/shear.   Plan discussed with patient and sister at bedside. Questions answered.     Please reconsult if any wound changes or if we can be of further assistance (ext [phone]).

## 2024-08-27 NOTE — DISCHARGE NOTE NURSING/CASE MANAGEMENT/SOCIAL WORK - PATIENT PORTAL LINK FT
You can access the FollowMyHealth Patient Portal offered by [facility] by registering at the following website: [email or web address]. By joining [facility]’s FollowMyHealth portal, you will also be able to view your health information using other applications (apps) compatible with our system.

## 2024-08-27 NOTE — DISCHARGE NOTE PROVIDER - HOSPITAL COURSE
74F with history of CVA with residual R sided weakness and expressive aphasia, pAFib on DOAC, HTN, HLD, CAD, and OA presents to the hospital with new onset seizures x2.     # New onset seizure.   ·  Plan: Patient with new onset likely iso prior stroke history  - vEEG w/ -Left temporal focal cerebral dysfunction (can be structural or functional in etiology). No epileptiform abnormalities or seizures in 2 days of recording  - Apprec Neurology recs, increasing Keppra to 1g BID  - No further inpatient neurologic workup required at this time, including no need for further EEG monitoring  - Outpatient epilepsy follow up at [address] [place] [address]  - Seizure precautions, Fall and aspiration precautions  - Dysphagia screen, S&S eval: soft/bite sized    # Elevated troponin.   ·  Plan: Patient with elevated troponin with uptrend on repeat but CK/CKMB not significant, likely iso demand  - Denies chest pain, SOB, palpitations or dizziness/LOC currently  - EKG with TWI in V1-V2 but no significant findings of ischemia  - TTE: EF 43%, hypokinesis of mid anteroseptal, mid inferoseptal and apical septal walls, Organized fibrinous vs coagulant material is seen in the pericardial space, small pericardial effusion  - Per cardio: Start Entresto 24/26mg BID  - A1c 5.3, , TSH WNL  - Cardiology following, apprec recs    # Leg edema, right.   ·  Plan: Patient with minimal R leg non-pitting edema, does have pain in b/l legs but also with ?polyneuropathy on gabapentin  - States her RLE edema is chronic for >7 months  - Dopplers of b/l LE negative for DVT, however exam was limited. Pt also reports adherence to eliquis    # Paroxysmal atrial fibrillation.   ·  Plan: C/w eliquis  - Not on rate controlling meds. Sinus minda on tele 8/25  - Monitor on Telemetry.    # History of CVA in adulthood.   ·  Plan: Not on anti-platelets, on Eliquis  - c/w statin therapy  - PT eval: ADILIA.    # Essential hypertension.   ·  Plan: Not on meds, monitor BP  - BP at goal.    # HLD (hyperlipidemia).   ·  Plan: - c/w atorvastatin.    PT recs: ADILIA, to return to [facility]. Medically stable for discharge to Rehab.  PMD, Cardiology and Neurology follow up as outpatient.

## 2024-08-27 NOTE — DISCHARGE NOTE PROVIDER - CARE PROVIDER_API CALL
Danielle Duran  Internal Medicine  [phone] [place], [place] [address]  Phone: [phone]  Fax: [phone]  Follow Up Time:

## 2024-08-27 NOTE — DISCHARGE NOTE PROVIDER - NSDCCPCAREPLAN_GEN_ALL_CORE_FT
PRINCIPAL DISCHARGE DIAGNOSIS  Diagnosis: Witnessed seizure-like activity  Assessment and Plan of Treatment: New onset seizure likely due to prior stroke history  - vEEG w/ -Left temporal focal cerebral dysfunction (can be structural or functional in etiology). No epileptiform abnormalities or seizures in 2 days of recording  - Started on keppra per Neurology recommendations  - Outpatient epilepsy follow up at [address] [place] [address]  - Swallow eval: soft/bite sized        SECONDARY DISCHARGE DIAGNOSES  Diagnosis: Elevated troponin  Assessment and Plan of Treatment: Elevated troponin likely due to demand  - Denies chest pain, SOB, palpitations or dizziness  - EKG with TWI in V1-V2 but no significant findings of ischemia  - TTE: EF 43%, hypokinesis of mid anteroseptal, mid inferoseptal and apical septal walls, Organized fibrinous vs coagulant material is seen in the pericardial space, small pericardial effusion  - Seen by Cardiology and recommended to start entresto, to be continued at Rehab  - PMD and Cardiology follow up as outpatient    Diagnosis: History of CVA in adulthood  Assessment and Plan of Treatment: Not on anti-platelets, on Eliquis  - continue with statin and eliquis therapy  - Neurology follow up as outpatient     PRINCIPAL DISCHARGE DIAGNOSIS  Diagnosis: Witnessed seizure-like activity  Assessment and Plan of Treatment: New onset seizure likely due to prior stroke history  - vEEG w/ -Left temporal focal cerebral dysfunction (can be structural or functional in etiology). No epileptiform abnormalities or seizures in 2 days of recording  - Started on keppra per Neurology recommendations  - Outpatient epilepsy follow up at [address] [place] [address]  - Swallow eval: soft/bite sized diet        SECONDARY DISCHARGE DIAGNOSES  Diagnosis: Elevated troponin  Assessment and Plan of Treatment: Elevated troponin likely due to demand  - Denies chest pain, SOB, palpitations or dizziness  - EKG with TWI in V1-V2 but no significant findings of ischemia  - TTE: EF 43%, hypokinesis of mid anteroseptal, mid inferoseptal and apical septal walls, Organized fibrinous vs coagulant material is seen in the pericardial space, small pericardial effusion  - Seen by Cardiology and recommended to start entresto, to be continued at Rehab  - PMD and Cardiology follow up as outpatient    Diagnosis: History of CVA in adulthood  Assessment and Plan of Treatment: Not on anti-platelets, on Eliquis  - continue with statin and eliquis therapy  - Neurology follow up as outpatient     PRINCIPAL DISCHARGE DIAGNOSIS  Diagnosis: Witnessed seizure-like activity  Assessment and Plan of Treatment: New onset seizure likely due to prior stroke history  - vEEG w/ -Left temporal focal cerebral dysfunction (can be structural or functional in etiology). No epileptiform abnormalities or seizures in 2 days of recording  - Started on keppra per Neurology recommendations  - Outpatient epilepsy follow up at [address] [place] [address]  - Swallow evaluation recs: soft/bite sized diet        SECONDARY DISCHARGE DIAGNOSES  Diagnosis: Elevated troponin  Assessment and Plan of Treatment: Elevated troponin likely due to demand  - Denies chest pain, SOB, palpitations or dizziness  - EKG with TWI in V1-V2 but no significant findings of ischemia  - TTE: EF 43%, hypokinesis of mid anteroseptal, mid inferoseptal and apical septal walls, Organized fibrinous vs coagulant material is seen in the pericardial space, small pericardial effusion  - Seen by Cardiology and recommended to start entresto, to be continued at Rehab  - PMD and Cardiology follow up as outpatient    Diagnosis: History of CVA in adulthood  Assessment and Plan of Treatment: Not on anti-platelets, on Eliquis  - continue with statin and eliquis therapy  - Neurology follow up as outpatient

## 2024-08-27 NOTE — DISCHARGE NOTE PROVIDER - NSDCMRMEDTOKEN_GEN_ALL_CORE_FT
acetaminophen 325 mg oral tablet: 2 tab(s) orally every 6 hours As needed Temp greater or equal to 38C (100.4F), Mild Pain (1 - 3)  aluminum hydroxide-magnesium hydroxide 200 mg-200 mg/5 mL oral suspension: 30 milliliter(s) orally every 4 hours As needed Dyspepsia  ascorbic acid 500 mg oral tablet: 1 tab(s) orally once a day  atorvastatin 40 mg oral tablet: 1 tab(s) orally once a day (at bedtime)  Eliquis 5 mg oral tablet: 1 tab(s) orally 2 times a day  fluticasone 50 mcg/inh nasal spray: 1 spray(s) nasal 2 times a day  gabapentin 300 mg oral capsule: 1 cap(s) orally 3 times a day  levETIRAcetam 1000 mg oral tablet: 1 tab(s) orally 2 times a day  melatonin 3 mg oral tablet: 1 tab(s) orally once a day (at bedtime) As needed Insomnia  Multiple Vitamins oral tablet: 1 tab(s) orally once a day  ocular lubricant ophthalmic solution: 1 drop(s) to each affected eye 2 times a day  sacubitril-valsartan 24 mg-26 mg oral tablet: 1 tab(s) orally 2 times a day

## 2024-08-27 NOTE — ADVANCED PRACTICE NURSE CONSULT - REASON FOR CONSULT
Patient seen on skin care rounds after wound care referral received for assessment of skin impairment and recommendations of topical management. Patient H/O of CVA with residual R sided weakness and expressive aphasia, pAFib on DOAC, HTN, HLD, CAD, and OA presents to the hospital with new onset seizures x2.   Chart reviewed: WBC 5.86, H/H 10.7/33.3, platelets 343, BMI 28.6, Faizan 16.

## 2024-08-27 NOTE — ADVANCED PRACTICE NURSE CONSULT - ASSESSMENT
General: A&Ox4, expressive aphasia, bedbound, two person turn but attempts to assist, incontinent of urine and stool- external female urinary  in place. Skin warm, dry with increased moisture in intertriginous folds, adequate skin turgor, scattered areas of hyperpigmentation and hypopigmentation to right buttock. Pain upon movement of right leg 2/2 arthritis of knee. (+) 2 pulses, no BLLE swelling.     Left heel- resolving Deep tissue pressure injury measuring [id number] presenting as fading erythema. No palpable tissue type changes. Periwound skin intact. Goals of care: Continue to offload, monitor for tissue type changes.

## 2024-08-27 NOTE — DISCHARGE NOTE PROVIDER - NSFOLLOWUPCLINICS_GEN_ALL_ED_FT
[facility]  Neurology Epilepsy  [address] [facility], [address] [place] [address]  Phone: [phone]  Fax: [phone]    Cardiology at [facility]  Cardiology  [address] [place] [address]  Phone: [phone]  Fax:

## 2024-08-27 NOTE — DISCHARGE NOTE PROVIDER - ATTENDING DISCHARGE PHYSICAL EXAMINATION:
VSS, NAD, doing well, expressive aphasia at baseline, eager to going back to Rehab  Chest: CTA B/L  Heart: S1S2 Ok  Abd: Soft/NT/ND  Extrem: No edema  AAx3, aphasic, RLE weakness  Discussed with Sister Sis on day of discharge

## 2024-12-03 PROBLEM — E78.5 HYPERLIPIDEMIA, UNSPECIFIED: Chronic | Status: ACTIVE | Noted: 2024-08-23

## 2024-12-03 PROBLEM — I10 ESSENTIAL (PRIMARY) HYPERTENSION: Chronic | Status: ACTIVE | Noted: 2024-08-23

## 2024-12-03 PROBLEM — G62.9 POLYNEUROPATHY, UNSPECIFIED: Chronic | Status: ACTIVE | Noted: 2024-08-23

## 2024-12-03 PROBLEM — I48.0 PAROXYSMAL ATRIAL FIBRILLATION: Chronic | Status: ACTIVE | Noted: 2024-08-23

## 2024-12-03 PROBLEM — M19.90 UNSPECIFIED OSTEOARTHRITIS, UNSPECIFIED SITE: Chronic | Status: ACTIVE | Noted: 2024-08-23

## 2024-12-03 PROBLEM — Z86.73 PERSONAL HISTORY OF TRANSIENT ISCHEMIC ATTACK (TIA), AND CEREBRAL INFARCTION WITHOUT RESIDUAL DEFICITS: Chronic | Status: ACTIVE | Noted: 2024-08-23

## 2025-01-02 ENCOUNTER — APPOINTMENT (OUTPATIENT)
Dept: NEUROLOGY | Facility: HOSPITAL | Age: 75
End: 2025-01-02

## 2025-01-02 ENCOUNTER — OUTPATIENT (OUTPATIENT)
Dept: OUTPATIENT SERVICES | Facility: HOSPITAL | Age: 75
LOS: 1 days | End: 2025-01-02

## 2025-01-02 VITALS
TEMPERATURE: 97.7 F | OXYGEN SATURATION: 98 % | BODY MASS INDEX: 29.53 KG/M2 | RESPIRATION RATE: 16 BRPM | WEIGHT: 173 LBS | DIASTOLIC BLOOD PRESSURE: 82 MMHG | HEIGHT: 64 IN | HEART RATE: 61 BPM | SYSTOLIC BLOOD PRESSURE: 136 MMHG

## 2025-01-02 DIAGNOSIS — R41.89 OTHER SYMPTOMS AND SIGNS INVOLVING COGNITIVE FUNCTIONS AND AWARENESS: ICD-10-CM

## 2025-01-02 DIAGNOSIS — R56.9 UNSPECIFIED CONVULSIONS: ICD-10-CM

## 2025-01-02 DIAGNOSIS — G40.909 EPILEPSY, UNSPECIFIED, NOT INTRACTABLE, W/OUT STATUS EPILEPTICUS: ICD-10-CM

## 2025-01-02 PROCEDURE — G0463: CPT

## 2025-01-02 RX ORDER — SENNOSIDES 8.6 MG TABLETS 8.6 MG/1
8.6 TABLET ORAL AT BEDTIME
Refills: 0 | Status: ACTIVE | COMMUNITY
Start: 2025-01-02

## 2025-01-02 RX ORDER — GABAPENTIN 400 MG/1
400 CAPSULE ORAL 3 TIMES DAILY
Refills: 0 | Status: ACTIVE | COMMUNITY
Start: 2025-01-02

## 2025-01-02 RX ORDER — APIXABAN 5 MG/1
5 TABLET, FILM COATED ORAL
Refills: 0 | Status: ACTIVE | COMMUNITY
Start: 2025-01-02

## 2025-01-02 RX ORDER — LIDOCAINE HCL 4 %
4 LIQUID ROLL-ON (ML) TOPICAL
Refills: 0 | Status: ACTIVE | COMMUNITY
Start: 2025-01-02

## 2025-01-02 RX ORDER — CARBOXYMETHYLCELLULOSE SODIUM 5 MG/ML
0.5 SOLUTION/ DROPS OPHTHALMIC
Refills: 0 | Status: ACTIVE | COMMUNITY
Start: 2025-01-02

## 2025-01-02 RX ORDER — SODIUM CHLORIDE 0.65 %
0.65 AEROSOL, SPRAY (ML) NASAL TWICE DAILY
Refills: 0 | Status: ACTIVE | COMMUNITY
Start: 2025-01-02

## 2025-01-02 RX ORDER — LEVETIRACETAM 1000 MG/1
1000 TABLET, FILM COATED ORAL TWICE DAILY
Refills: 0 | Status: ACTIVE | COMMUNITY
Start: 2025-01-02

## 2025-01-02 RX ORDER — GLUCOSAMINE HCL/CHONDROITIN SU 500-400 MG
3 CAPSULE ORAL AT BEDTIME
Refills: 0 | Status: ACTIVE | COMMUNITY
Start: 2025-01-02

## 2025-01-02 RX ORDER — LORATADINE 5 MG
17 TABLET,CHEWABLE ORAL
Refills: 0 | Status: ACTIVE | COMMUNITY
Start: 2025-01-02

## 2025-01-02 RX ORDER — ACETAMINOPHEN 325 MG/1
325 TABLET ORAL EVERY 8 HOURS
Refills: 0 | Status: ACTIVE | COMMUNITY
Start: 2025-01-02

## 2025-01-02 RX ORDER — SACUBITRIL AND VALSARTAN 24; 26 MG/1; MG/1
24-26 TABLET, FILM COATED ORAL TWICE DAILY
Refills: 0 | Status: ACTIVE | COMMUNITY
Start: 2025-01-02

## 2025-05-30 ENCOUNTER — APPOINTMENT (OUTPATIENT)
Dept: MRI IMAGING | Facility: HOSPITAL | Age: 75
End: 2025-05-30

## 2025-06-06 ENCOUNTER — APPOINTMENT (OUTPATIENT)
Dept: MRI IMAGING | Facility: HOSPITAL | Age: 75
End: 2025-06-06

## 2025-06-06 ENCOUNTER — OUTPATIENT (OUTPATIENT)
Dept: OUTPATIENT SERVICES | Age: 75
LOS: 1 days | End: 2025-06-06

## 2025-06-06 DIAGNOSIS — I69.120 APHASIA FOLLOWING NONTRAUMATIC INTRACEREBRAL HEMORRHAGE: ICD-10-CM

## 2025-06-06 PROCEDURE — 70551 MRI BRAIN STEM W/O DYE: CPT | Mod: 26

## 2025-06-19 ENCOUNTER — OUTPATIENT (OUTPATIENT)
Dept: OUTPATIENT SERVICES | Facility: HOSPITAL | Age: 75
LOS: 1 days | End: 2025-06-19

## 2025-06-19 ENCOUNTER — APPOINTMENT (OUTPATIENT)
Dept: NEUROLOGY | Facility: HOSPITAL | Age: 75
End: 2025-06-19

## 2025-06-19 VITALS
OXYGEN SATURATION: 96 % | TEMPERATURE: 98 F | BODY MASS INDEX: 31.07 KG/M2 | RESPIRATION RATE: 16 BRPM | DIASTOLIC BLOOD PRESSURE: 88 MMHG | WEIGHT: 182 LBS | HEIGHT: 64 IN | SYSTOLIC BLOOD PRESSURE: 121 MMHG | HEART RATE: 83 BPM

## 2025-06-19 DIAGNOSIS — R56.9 UNSPECIFIED CONVULSIONS: ICD-10-CM

## 2025-06-19 PROCEDURE — G0463: CPT

## 2025-06-19 PROCEDURE — 99214 OFFICE O/P EST MOD 30 MIN: CPT

## 2025-06-19 RX ORDER — BRIVARACETAM 75 MG/1
75 TABLET, FILM COATED ORAL TWICE DAILY
Qty: 60 | Refills: 3 | Status: ACTIVE | COMMUNITY
Start: 2025-06-19 | End: 1900-01-01

## 2025-08-16 ENCOUNTER — INPATIENT (INPATIENT)
Facility: HOSPITAL | Age: 75
LOS: 5 days | Discharge: NURSING, REHAB OR LONG-TERM CARE | End: 2025-08-22
Attending: STUDENT IN AN ORGANIZED HEALTH CARE EDUCATION/TRAINING PROGRAM | Admitting: STUDENT IN AN ORGANIZED HEALTH CARE EDUCATION/TRAINING PROGRAM
Payer: MEDICARE

## 2025-08-16 VITALS
TEMPERATURE: 98 F | RESPIRATION RATE: 20 BRPM | OXYGEN SATURATION: 96 % | WEIGHT: 195.99 LBS | SYSTOLIC BLOOD PRESSURE: 141 MMHG | HEART RATE: 87 BPM | DIASTOLIC BLOOD PRESSURE: 93 MMHG

## 2025-08-16 PROCEDURE — 99285 EMERGENCY DEPT VISIT HI MDM: CPT | Mod: FS,GC

## 2025-08-17 DIAGNOSIS — Z29.9 ENCOUNTER FOR PROPHYLACTIC MEASURES, UNSPECIFIED: ICD-10-CM

## 2025-08-17 DIAGNOSIS — S72.492A OTHER FRACTURE OF LOWER END OF LEFT FEMUR, INITIAL ENCOUNTER FOR CLOSED FRACTURE: ICD-10-CM

## 2025-08-17 DIAGNOSIS — G62.9 POLYNEUROPATHY, UNSPECIFIED: ICD-10-CM

## 2025-08-17 DIAGNOSIS — I50.22 CHRONIC SYSTOLIC (CONGESTIVE) HEART FAILURE: ICD-10-CM

## 2025-08-17 DIAGNOSIS — I10 ESSENTIAL (PRIMARY) HYPERTENSION: ICD-10-CM

## 2025-08-17 DIAGNOSIS — Z01.818 ENCOUNTER FOR OTHER PREPROCEDURAL EXAMINATION: ICD-10-CM

## 2025-08-17 DIAGNOSIS — G40.909 EPILEPSY, UNSPECIFIED, NOT INTRACTABLE, WITHOUT STATUS EPILEPTICUS: ICD-10-CM

## 2025-08-17 DIAGNOSIS — Z86.73 PERSONAL HISTORY OF TRANSIENT ISCHEMIC ATTACK (TIA), AND CEREBRAL INFARCTION WITHOUT RESIDUAL DEFICITS: ICD-10-CM

## 2025-08-17 DIAGNOSIS — I48.0 PAROXYSMAL ATRIAL FIBRILLATION: ICD-10-CM

## 2025-08-17 DIAGNOSIS — K59.00 CONSTIPATION, UNSPECIFIED: ICD-10-CM

## 2025-08-17 DIAGNOSIS — F41.9 ANXIETY DISORDER, UNSPECIFIED: ICD-10-CM

## 2025-08-17 LAB
ALBUMIN SERPL ELPH-MCNC: 3.4 G/DL — SIGNIFICANT CHANGE UP (ref 3.3–5)
ALP SERPL-CCNC: 95 U/L — SIGNIFICANT CHANGE UP (ref 40–120)
ALT FLD-CCNC: 25 U/L — SIGNIFICANT CHANGE UP (ref 4–33)
ANION GAP SERPL CALC-SCNC: 14 MMOL/L — SIGNIFICANT CHANGE UP (ref 7–14)
APTT BLD: 32 SEC — SIGNIFICANT CHANGE UP (ref 26.1–36.8)
AST SERPL-CCNC: 30 U/L — SIGNIFICANT CHANGE UP (ref 4–32)
BASOPHILS # BLD AUTO: 0.02 K/UL — SIGNIFICANT CHANGE UP (ref 0–0.2)
BASOPHILS NFR BLD AUTO: 0.2 % — SIGNIFICANT CHANGE UP (ref 0–2)
BILIRUB SERPL-MCNC: 0.5 MG/DL — SIGNIFICANT CHANGE UP (ref 0.2–1.2)
BLD GP AB SCN SERPL QL: NEGATIVE — SIGNIFICANT CHANGE UP
BUN SERPL-MCNC: 28 MG/DL — HIGH (ref 7–23)
CALCIUM SERPL-MCNC: 9 MG/DL — SIGNIFICANT CHANGE UP (ref 8.4–10.5)
CHLORIDE SERPL-SCNC: 99 MMOL/L — SIGNIFICANT CHANGE UP (ref 98–107)
CO2 SERPL-SCNC: 19 MMOL/L — LOW (ref 22–31)
CREAT SERPL-MCNC: 0.92 MG/DL — SIGNIFICANT CHANGE UP (ref 0.5–1.3)
EGFR: 65 ML/MIN/1.73M2 — SIGNIFICANT CHANGE UP
EGFR: 65 ML/MIN/1.73M2 — SIGNIFICANT CHANGE UP
EOSINOPHIL # BLD AUTO: 0 K/UL — SIGNIFICANT CHANGE UP (ref 0–0.5)
EOSINOPHIL NFR BLD AUTO: 0 % — SIGNIFICANT CHANGE UP (ref 0–6)
GLUCOSE SERPL-MCNC: 133 MG/DL — HIGH (ref 70–99)
HCT VFR BLD CALC: 26.8 % — LOW (ref 34.5–45)
HGB BLD-MCNC: 8.7 G/DL — LOW (ref 11.5–15.5)
IMM GRANULOCYTES # BLD AUTO: 0.06 K/UL — SIGNIFICANT CHANGE UP (ref 0–0.07)
IMM GRANULOCYTES NFR BLD AUTO: 0.6 % — SIGNIFICANT CHANGE UP (ref 0–0.9)
INR BLD: 1.69 RATIO — HIGH (ref 0.85–1.16)
LYMPHOCYTES # BLD AUTO: 2.04 K/UL — SIGNIFICANT CHANGE UP (ref 1–3.3)
LYMPHOCYTES NFR BLD AUTO: 20.3 % — SIGNIFICANT CHANGE UP (ref 13–44)
MCHC RBC-ENTMCNC: 30.5 PG — SIGNIFICANT CHANGE UP (ref 27–34)
MCHC RBC-ENTMCNC: 32.5 G/DL — SIGNIFICANT CHANGE UP (ref 32–36)
MCV RBC AUTO: 94 FL — SIGNIFICANT CHANGE UP (ref 80–100)
MONOCYTES # BLD AUTO: 0.65 K/UL — SIGNIFICANT CHANGE UP (ref 0–0.9)
MONOCYTES NFR BLD AUTO: 6.5 % — SIGNIFICANT CHANGE UP (ref 2–14)
NEUTROPHILS # BLD AUTO: 7.3 K/UL — SIGNIFICANT CHANGE UP (ref 1.8–7.4)
NEUTROPHILS NFR BLD AUTO: 72.4 % — SIGNIFICANT CHANGE UP (ref 43–77)
NRBC # BLD AUTO: 0.02 K/UL — HIGH (ref 0–0)
NRBC # FLD: 0.02 K/UL — HIGH (ref 0–0)
NRBC BLD AUTO-RTO: 0 /100 WBCS — SIGNIFICANT CHANGE UP (ref 0–0)
PLATELET # BLD AUTO: 307 K/UL — SIGNIFICANT CHANGE UP (ref 150–400)
PMV BLD: 9.7 FL — SIGNIFICANT CHANGE UP (ref 7–13)
POTASSIUM SERPL-MCNC: 5.3 MMOL/L — SIGNIFICANT CHANGE UP (ref 3.5–5.3)
POTASSIUM SERPL-SCNC: 5.3 MMOL/L — SIGNIFICANT CHANGE UP (ref 3.5–5.3)
PROT SERPL-MCNC: 6.9 G/DL — SIGNIFICANT CHANGE UP (ref 6–8.3)
PROTHROM AB SERPL-ACNC: 19.5 SEC — HIGH (ref 9.9–13.4)
RBC # BLD: 2.85 M/UL — LOW (ref 3.8–5.2)
RBC # FLD: 14.9 % — HIGH (ref 10.3–14.5)
RH IG SCN BLD-IMP: POSITIVE — SIGNIFICANT CHANGE UP
SODIUM SERPL-SCNC: 132 MMOL/L — LOW (ref 135–145)
WBC # BLD: 10.07 K/UL — SIGNIFICANT CHANGE UP (ref 3.8–10.5)
WBC # FLD AUTO: 10.07 K/UL — SIGNIFICANT CHANGE UP (ref 3.8–10.5)

## 2025-08-17 PROCEDURE — 71045 X-RAY EXAM CHEST 1 VIEW: CPT | Mod: 26

## 2025-08-17 PROCEDURE — 72170 X-RAY EXAM OF PELVIS: CPT | Mod: 26

## 2025-08-17 PROCEDURE — 73700 CT LOWER EXTREMITY W/O DYE: CPT | Mod: 26,LT

## 2025-08-17 PROCEDURE — 73560 X-RAY EXAM OF KNEE 1 OR 2: CPT | Mod: 26,LT

## 2025-08-17 PROCEDURE — 99223 1ST HOSP IP/OBS HIGH 75: CPT

## 2025-08-17 PROCEDURE — 70450 CT HEAD/BRAIN W/O DYE: CPT | Mod: 26

## 2025-08-17 PROCEDURE — 73590 X-RAY EXAM OF LOWER LEG: CPT | Mod: 26,LT

## 2025-08-17 PROCEDURE — 73552 X-RAY EXAM OF FEMUR 2/>: CPT | Mod: 26,LT

## 2025-08-17 RX ORDER — B1/B2/B3/B5/B6/B12/VIT C/FOLIC 500-0.5 MG
1 TABLET ORAL DAILY
Refills: 0 | Status: DISCONTINUED | OUTPATIENT
Start: 2025-08-17 | End: 2025-08-22

## 2025-08-17 RX ORDER — FERROUS SULFATE 137(45) MG
325 TABLET, EXTENDED RELEASE ORAL
Refills: 0 | DISCHARGE

## 2025-08-17 RX ORDER — ESCITALOPRAM OXALATE 20 MG/1
10 TABLET ORAL DAILY
Refills: 0 | Status: DISCONTINUED | OUTPATIENT
Start: 2025-08-17 | End: 2025-08-22

## 2025-08-17 RX ORDER — IPRATROPIUM BROMIDE AND ALBUTEROL SULFATE .5; 2.5 MG/3ML; MG/3ML
3 SOLUTION RESPIRATORY (INHALATION)
Refills: 0 | DISCHARGE

## 2025-08-17 RX ORDER — FERROUS SULFATE 137(45) MG
325 TABLET, EXTENDED RELEASE ORAL DAILY
Refills: 0 | Status: DISCONTINUED | OUTPATIENT
Start: 2025-08-17 | End: 2025-08-22

## 2025-08-17 RX ORDER — ONDANSETRON HCL/PF 4 MG/2 ML
1 VIAL (ML) INJECTION
Refills: 0 | DISCHARGE

## 2025-08-17 RX ORDER — ALBUTEROL SULFATE 2.5 MG/3ML
2 VIAL, NEBULIZER (ML) INHALATION EVERY 6 HOURS
Refills: 0 | Status: DISCONTINUED | OUTPATIENT
Start: 2025-08-17 | End: 2025-08-22

## 2025-08-17 RX ORDER — GABAPENTIN 400 MG/1
400 CAPSULE ORAL THREE TIMES A DAY
Refills: 0 | Status: DISCONTINUED | OUTPATIENT
Start: 2025-08-17 | End: 2025-08-22

## 2025-08-17 RX ORDER — BRIVARACETAM 75 MG/1
1 TABLET, FILM COATED ORAL
Refills: 0 | DISCHARGE

## 2025-08-17 RX ORDER — LISINOPRIL 5 MG/1
5 TABLET ORAL DAILY
Refills: 0 | Status: DISCONTINUED | OUTPATIENT
Start: 2025-08-17 | End: 2025-08-22

## 2025-08-17 RX ORDER — BISACODYL 5 MG
1 TABLET, DELAYED RELEASE (ENTERIC COATED) ORAL
Refills: 0 | DISCHARGE

## 2025-08-17 RX ORDER — METOPROLOL SUCCINATE 50 MG/1
0.5 TABLET, EXTENDED RELEASE ORAL
Refills: 0 | DISCHARGE

## 2025-08-17 RX ORDER — ALPRAZOLAM 0.5 MG
0.25 TABLET, EXTENDED RELEASE 24 HR ORAL DAILY
Refills: 0 | Status: DISCONTINUED | OUTPATIENT
Start: 2025-08-17 | End: 2025-08-22

## 2025-08-17 RX ORDER — ESCITALOPRAM OXALATE 20 MG/1
1 TABLET ORAL
Refills: 0 | DISCHARGE

## 2025-08-17 RX ORDER — ONDANSETRON HCL/PF 4 MG/2 ML
4 VIAL (ML) INJECTION EVERY 8 HOURS
Refills: 0 | Status: DISCONTINUED | OUTPATIENT
Start: 2025-08-17 | End: 2025-08-22

## 2025-08-17 RX ORDER — BRIVARACETAM 75 MG/1
75 TABLET, FILM COATED ORAL
Refills: 0 | Status: DISCONTINUED | OUTPATIENT
Start: 2025-08-17 | End: 2025-08-22

## 2025-08-17 RX ORDER — MAGNESIUM, ALUMINUM HYDROXIDE 200-200 MG
30 TABLET,CHEWABLE ORAL EVERY 4 HOURS
Refills: 0 | Status: DISCONTINUED | OUTPATIENT
Start: 2025-08-17 | End: 2025-08-22

## 2025-08-17 RX ORDER — ACETAMINOPHEN 500 MG/5ML
650 LIQUID (ML) ORAL EVERY 6 HOURS
Refills: 0 | Status: DISCONTINUED | OUTPATIENT
Start: 2025-08-17 | End: 2025-08-18

## 2025-08-17 RX ORDER — ALPRAZOLAM 0.5 MG
1 TABLET, EXTENDED RELEASE 24 HR ORAL
Refills: 0 | DISCHARGE

## 2025-08-17 RX ORDER — SENNA 187 MG
2 TABLET ORAL
Refills: 0 | DISCHARGE

## 2025-08-17 RX ORDER — HYDROMORPHONE/SOD CHLOR,ISO/PF 2 MG/10 ML
0.2 SYRINGE (ML) INJECTION EVERY 4 HOURS
Refills: 0 | Status: DISCONTINUED | OUTPATIENT
Start: 2025-08-17 | End: 2025-08-18

## 2025-08-17 RX ORDER — ATORVASTATIN CALCIUM 80 MG/1
40 TABLET, FILM COATED ORAL AT BEDTIME
Refills: 0 | Status: DISCONTINUED | OUTPATIENT
Start: 2025-08-17 | End: 2025-08-22

## 2025-08-17 RX ORDER — LISINOPRIL 5 MG/1
1 TABLET ORAL
Refills: 0 | DISCHARGE

## 2025-08-17 RX ORDER — POLYETHYLENE GLYCOL 3350 17 G/17G
17 POWDER, FOR SOLUTION ORAL
Refills: 0 | DISCHARGE

## 2025-08-17 RX ORDER — POVIDONE-IODINE 7.5 %
1 SOLUTION, NON-ORAL TOPICAL ONCE
Refills: 0 | Status: DISCONTINUED | OUTPATIENT
Start: 2025-08-17 | End: 2025-08-22

## 2025-08-17 RX ORDER — POLYETHYLENE GLYCOL 3350 17 G/17G
17 POWDER, FOR SOLUTION ORAL AT BEDTIME
Refills: 0 | Status: DISCONTINUED | OUTPATIENT
Start: 2025-08-17 | End: 2025-08-22

## 2025-08-17 RX ORDER — MELATONIN 5 MG
3 TABLET ORAL AT BEDTIME
Refills: 0 | Status: DISCONTINUED | OUTPATIENT
Start: 2025-08-17 | End: 2025-08-22

## 2025-08-17 RX ORDER — METOPROLOL SUCCINATE 50 MG/1
12.5 TABLET, EXTENDED RELEASE ORAL
Refills: 0 | Status: DISCONTINUED | OUTPATIENT
Start: 2025-08-17 | End: 2025-08-22

## 2025-08-17 RX ORDER — SENNA 187 MG
2 TABLET ORAL AT BEDTIME
Refills: 0 | Status: DISCONTINUED | OUTPATIENT
Start: 2025-08-17 | End: 2025-08-22

## 2025-08-17 RX ADMIN — Medication 2 TABLET(S): at 23:42

## 2025-08-17 RX ADMIN — ATORVASTATIN CALCIUM 40 MILLIGRAM(S): 80 TABLET, FILM COATED ORAL at 23:41

## 2025-08-17 RX ADMIN — Medication 2 PUFF(S): at 23:42

## 2025-08-17 RX ADMIN — GABAPENTIN 400 MILLIGRAM(S): 400 CAPSULE ORAL at 23:41

## 2025-08-17 RX ADMIN — METOPROLOL SUCCINATE 12.5 MILLIGRAM(S): 50 TABLET, EXTENDED RELEASE ORAL at 23:40

## 2025-08-17 RX ADMIN — Medication 2 PUFF(S): at 16:51

## 2025-08-17 RX ADMIN — Medication 4 MILLIGRAM(S): at 03:37

## 2025-08-17 RX ADMIN — Medication 125 MILLILITER(S): at 07:03

## 2025-08-18 ENCOUNTER — RESULT REVIEW (OUTPATIENT)
Age: 75
End: 2025-08-18

## 2025-08-18 LAB
ALBUMIN SERPL ELPH-MCNC: 1.3 G/DL — LOW (ref 3.3–5)
ALBUMIN SERPL ELPH-MCNC: 3.3 G/DL — SIGNIFICANT CHANGE UP (ref 3.3–5)
ALP SERPL-CCNC: 30 U/L — LOW (ref 40–120)
ALP SERPL-CCNC: 81 U/L — SIGNIFICANT CHANGE UP (ref 40–120)
ALT FLD-CCNC: 18 U/L — SIGNIFICANT CHANGE UP (ref 4–33)
ALT FLD-CCNC: 7 U/L — SIGNIFICANT CHANGE UP (ref 4–33)
ANION GAP SERPL CALC-SCNC: 12 MMOL/L — SIGNIFICANT CHANGE UP (ref 7–14)
ANION GAP SERPL CALC-SCNC: 8 MMOL/L — SIGNIFICANT CHANGE UP (ref 7–14)
AST SERPL-CCNC: 18 U/L — SIGNIFICANT CHANGE UP (ref 4–32)
AST SERPL-CCNC: 8 U/L — SIGNIFICANT CHANGE UP (ref 4–32)
BASOPHILS # BLD AUTO: 0.01 K/UL — SIGNIFICANT CHANGE UP (ref 0–0.2)
BASOPHILS NFR BLD AUTO: 0.2 % — SIGNIFICANT CHANGE UP (ref 0–2)
BILIRUB SERPL-MCNC: 0.2 MG/DL — SIGNIFICANT CHANGE UP (ref 0.2–1.2)
BILIRUB SERPL-MCNC: 0.6 MG/DL — SIGNIFICANT CHANGE UP (ref 0.2–1.2)
BUN SERPL-MCNC: 10 MG/DL — SIGNIFICANT CHANGE UP (ref 7–23)
BUN SERPL-MCNC: 21 MG/DL — SIGNIFICANT CHANGE UP (ref 7–23)
CALCIUM SERPL-MCNC: 3.5 MG/DL — CRITICAL LOW (ref 8.4–10.5)
CALCIUM SERPL-MCNC: 8.4 MG/DL — SIGNIFICANT CHANGE UP (ref 8.4–10.5)
CHLORIDE SERPL-SCNC: 105 MMOL/L — SIGNIFICANT CHANGE UP (ref 98–107)
CHLORIDE SERPL-SCNC: 127 MMOL/L — HIGH (ref 98–107)
CO2 SERPL-SCNC: 10 MMOL/L — CRITICAL LOW (ref 22–31)
CO2 SERPL-SCNC: 21 MMOL/L — LOW (ref 22–31)
CREAT SERPL-MCNC: 0.31 MG/DL — LOW (ref 0.5–1.3)
CREAT SERPL-MCNC: 0.77 MG/DL — SIGNIFICANT CHANGE UP (ref 0.5–1.3)
EGFR: 110 ML/MIN/1.73M2 — SIGNIFICANT CHANGE UP
EGFR: 110 ML/MIN/1.73M2 — SIGNIFICANT CHANGE UP
EGFR: 81 ML/MIN/1.73M2 — SIGNIFICANT CHANGE UP
EGFR: 81 ML/MIN/1.73M2 — SIGNIFICANT CHANGE UP
EOSINOPHIL # BLD AUTO: 0.01 K/UL — SIGNIFICANT CHANGE UP (ref 0–0.5)
EOSINOPHIL NFR BLD AUTO: 0.2 % — SIGNIFICANT CHANGE UP (ref 0–6)
GLUCOSE BLDC GLUCOMTR-MCNC: 134 MG/DL — HIGH (ref 70–99)
GLUCOSE SERPL-MCNC: 113 MG/DL — HIGH (ref 70–99)
GLUCOSE SERPL-MCNC: 53 MG/DL — CRITICAL LOW (ref 70–99)
HCT VFR BLD CALC: 11.5 % — CRITICAL LOW (ref 34.5–45)
HCT VFR BLD CALC: 23.1 % — LOW (ref 34.5–45)
HGB BLD-MCNC: 3.5 G/DL — CRITICAL LOW (ref 11.5–15.5)
HGB BLD-MCNC: 7.3 G/DL — LOW (ref 11.5–15.5)
IMM GRANULOCYTES # BLD AUTO: 0.07 K/UL — SIGNIFICANT CHANGE UP (ref 0–0.07)
IMM GRANULOCYTES NFR BLD AUTO: 1.3 % — HIGH (ref 0–0.9)
LYMPHOCYTES # BLD AUTO: 1.05 K/UL — SIGNIFICANT CHANGE UP (ref 1–3.3)
LYMPHOCYTES NFR BLD AUTO: 19.6 % — SIGNIFICANT CHANGE UP (ref 13–44)
MAGNESIUM SERPL-MCNC: 0.9 MG/DL — CRITICAL LOW (ref 1.6–2.6)
MCHC RBC-ENTMCNC: 29.9 PG — SIGNIFICANT CHANGE UP (ref 27–34)
MCHC RBC-ENTMCNC: 30 PG — SIGNIFICANT CHANGE UP (ref 27–34)
MCHC RBC-ENTMCNC: 30.4 G/DL — LOW (ref 32–36)
MCHC RBC-ENTMCNC: 31.6 G/DL — LOW (ref 32–36)
MCV RBC AUTO: 95.1 FL — SIGNIFICANT CHANGE UP (ref 80–100)
MCV RBC AUTO: 98.3 FL — SIGNIFICANT CHANGE UP (ref 80–100)
MONOCYTES # BLD AUTO: 0.4 K/UL — SIGNIFICANT CHANGE UP (ref 0–0.9)
MONOCYTES NFR BLD AUTO: 7.5 % — SIGNIFICANT CHANGE UP (ref 2–14)
MRSA PCR RESULT.: SIGNIFICANT CHANGE UP
NEUTROPHILS # BLD AUTO: 3.82 K/UL — SIGNIFICANT CHANGE UP (ref 1.8–7.4)
NEUTROPHILS NFR BLD AUTO: 71.2 % — SIGNIFICANT CHANGE UP (ref 43–77)
NRBC # BLD AUTO: 0 K/UL — SIGNIFICANT CHANGE UP (ref 0–0)
NRBC # BLD AUTO: 0 K/UL — SIGNIFICANT CHANGE UP (ref 0–0)
NRBC # FLD: 0 K/UL — SIGNIFICANT CHANGE UP (ref 0–0)
NRBC # FLD: 0 K/UL — SIGNIFICANT CHANGE UP (ref 0–0)
NRBC BLD AUTO-RTO: 0 /100 WBCS — SIGNIFICANT CHANGE UP (ref 0–0)
NRBC BLD AUTO-RTO: 0 /100 WBCS — SIGNIFICANT CHANGE UP (ref 0–0)
PHOSPHATE SERPL-MCNC: 1.3 MG/DL — LOW (ref 2.5–4.5)
PLATELET # BLD AUTO: 126 K/UL — LOW (ref 150–400)
PLATELET # BLD AUTO: 232 K/UL — SIGNIFICANT CHANGE UP (ref 150–400)
PMV BLD: 9.4 FL — SIGNIFICANT CHANGE UP (ref 7–13)
PMV BLD: SIGNIFICANT CHANGE UP FL (ref 7–13)
POTASSIUM SERPL-MCNC: 1.9 MMOL/L — CRITICAL LOW (ref 3.5–5.3)
POTASSIUM SERPL-MCNC: 4.1 MMOL/L — SIGNIFICANT CHANGE UP (ref 3.5–5.3)
POTASSIUM SERPL-SCNC: 1.9 MMOL/L — CRITICAL LOW (ref 3.5–5.3)
POTASSIUM SERPL-SCNC: 4.1 MMOL/L — SIGNIFICANT CHANGE UP (ref 3.5–5.3)
PROT SERPL-MCNC: 2.4 G/DL — LOW (ref 6–8.3)
PROT SERPL-MCNC: 6.3 G/DL — SIGNIFICANT CHANGE UP (ref 6–8.3)
RBC # BLD: 1.17 M/UL — LOW (ref 3.8–5.2)
RBC # BLD: 2.43 M/UL — LOW (ref 3.8–5.2)
RBC # FLD: 14.6 % — HIGH (ref 10.3–14.5)
RBC # FLD: SIGNIFICANT CHANGE UP % (ref 10.3–14.5)
S AUREUS DNA NOSE QL NAA+PROBE: SIGNIFICANT CHANGE UP
SODIUM SERPL-SCNC: 138 MMOL/L — SIGNIFICANT CHANGE UP (ref 135–145)
SODIUM SERPL-SCNC: 145 MMOL/L — SIGNIFICANT CHANGE UP (ref 135–145)
WBC # BLD: 10.98 K/UL — HIGH (ref 3.8–10.5)
WBC # BLD: 5.36 K/UL — SIGNIFICANT CHANGE UP (ref 3.8–10.5)
WBC # FLD AUTO: 10.98 K/UL — HIGH (ref 3.8–10.5)
WBC # FLD AUTO: 5.36 K/UL — SIGNIFICANT CHANGE UP (ref 3.8–10.5)

## 2025-08-18 PROCEDURE — 93010 ELECTROCARDIOGRAM REPORT: CPT | Mod: 76

## 2025-08-18 PROCEDURE — 99497 ADVNCD CARE PLAN 30 MIN: CPT | Mod: GC

## 2025-08-18 PROCEDURE — 76376 3D RENDER W/INTRP POSTPROCES: CPT | Mod: 26

## 2025-08-18 PROCEDURE — 99232 SBSQ HOSP IP/OBS MODERATE 35: CPT | Mod: GC

## 2025-08-18 PROCEDURE — 93356 MYOCRD STRAIN IMG SPCKL TRCK: CPT

## 2025-08-18 PROCEDURE — 93306 TTE W/DOPPLER COMPLETE: CPT | Mod: 26

## 2025-08-18 RX ORDER — OXYCODONE HYDROCHLORIDE 30 MG/1
5 TABLET ORAL EVERY 6 HOURS
Refills: 0 | Status: DISCONTINUED | OUTPATIENT
Start: 2025-08-18 | End: 2025-08-22

## 2025-08-18 RX ORDER — ACETAMINOPHEN 500 MG/5ML
650 LIQUID (ML) ORAL EVERY 6 HOURS
Refills: 0 | Status: DISCONTINUED | OUTPATIENT
Start: 2025-08-18 | End: 2025-08-22

## 2025-08-18 RX ADMIN — GABAPENTIN 400 MILLIGRAM(S): 400 CAPSULE ORAL at 21:27

## 2025-08-18 RX ADMIN — Medication 325 MILLIGRAM(S): at 13:26

## 2025-08-18 RX ADMIN — BRIVARACETAM 75 MILLIGRAM(S): 75 TABLET, FILM COATED ORAL at 00:03

## 2025-08-18 RX ADMIN — Medication 2 PUFF(S): at 12:04

## 2025-08-18 RX ADMIN — Medication 125 MILLILITER(S): at 18:41

## 2025-08-18 RX ADMIN — BRIVARACETAM 75 MILLIGRAM(S): 75 TABLET, FILM COATED ORAL at 18:13

## 2025-08-18 RX ADMIN — ATORVASTATIN CALCIUM 40 MILLIGRAM(S): 80 TABLET, FILM COATED ORAL at 21:29

## 2025-08-18 RX ADMIN — Medication 650 MILLIGRAM(S): at 19:15

## 2025-08-18 RX ADMIN — POLYETHYLENE GLYCOL 3350 17 GRAM(S): 17 POWDER, FOR SOLUTION ORAL at 21:27

## 2025-08-18 RX ADMIN — GABAPENTIN 400 MILLIGRAM(S): 400 CAPSULE ORAL at 05:39

## 2025-08-18 RX ADMIN — Medication 1 APPLICATION(S): at 05:37

## 2025-08-18 RX ADMIN — Medication 650 MILLIGRAM(S): at 13:27

## 2025-08-18 RX ADMIN — METOPROLOL SUCCINATE 12.5 MILLIGRAM(S): 50 TABLET, EXTENDED RELEASE ORAL at 05:37

## 2025-08-18 RX ADMIN — GABAPENTIN 400 MILLIGRAM(S): 400 CAPSULE ORAL at 14:20

## 2025-08-18 RX ADMIN — Medication 2 PUFF(S): at 04:27

## 2025-08-18 RX ADMIN — Medication 125 MILLILITER(S): at 10:32

## 2025-08-18 RX ADMIN — Medication 1 TABLET(S): at 13:26

## 2025-08-18 RX ADMIN — Medication 2 PUFF(S): at 18:40

## 2025-08-18 RX ADMIN — Medication 2 TABLET(S): at 21:27

## 2025-08-18 RX ADMIN — BRIVARACETAM 75 MILLIGRAM(S): 75 TABLET, FILM COATED ORAL at 05:37

## 2025-08-18 RX ADMIN — ESCITALOPRAM OXALATE 10 MILLIGRAM(S): 20 TABLET ORAL at 18:12

## 2025-08-18 RX ADMIN — LISINOPRIL 5 MILLIGRAM(S): 5 TABLET ORAL at 05:39

## 2025-08-18 RX ADMIN — Medication 650 MILLIGRAM(S): at 18:44

## 2025-08-18 RX ADMIN — Medication 20 MILLIGRAM(S): at 13:27

## 2025-08-18 RX ADMIN — Medication 0.25 MILLIGRAM(S): at 13:27

## 2025-08-18 RX ADMIN — Medication 650 MILLIGRAM(S): at 13:57

## 2025-08-19 LAB
ALBUMIN SERPL ELPH-MCNC: 2.9 G/DL — LOW (ref 3.3–5)
ALP SERPL-CCNC: 85 U/L — SIGNIFICANT CHANGE UP (ref 40–120)
ALT FLD-CCNC: 16 U/L — SIGNIFICANT CHANGE UP (ref 4–33)
ANION GAP SERPL CALC-SCNC: 10 MMOL/L — SIGNIFICANT CHANGE UP (ref 7–14)
ANION GAP SERPL CALC-SCNC: 12 MMOL/L — SIGNIFICANT CHANGE UP (ref 7–14)
AST SERPL-CCNC: 16 U/L — SIGNIFICANT CHANGE UP (ref 4–32)
BILIRUB SERPL-MCNC: 0.8 MG/DL — SIGNIFICANT CHANGE UP (ref 0.2–1.2)
BUN SERPL-MCNC: 14 MG/DL — SIGNIFICANT CHANGE UP (ref 7–23)
BUN SERPL-MCNC: 17 MG/DL — SIGNIFICANT CHANGE UP (ref 7–23)
CALCIUM SERPL-MCNC: 8.1 MG/DL — LOW (ref 8.4–10.5)
CALCIUM SERPL-MCNC: 8.2 MG/DL — LOW (ref 8.4–10.5)
CHLORIDE SERPL-SCNC: 107 MMOL/L — SIGNIFICANT CHANGE UP (ref 98–107)
CHLORIDE SERPL-SCNC: 109 MMOL/L — HIGH (ref 98–107)
CO2 SERPL-SCNC: 18 MMOL/L — LOW (ref 22–31)
CO2 SERPL-SCNC: 19 MMOL/L — LOW (ref 22–31)
CREAT SERPL-MCNC: 0.67 MG/DL — SIGNIFICANT CHANGE UP (ref 0.5–1.3)
CREAT SERPL-MCNC: 0.72 MG/DL — SIGNIFICANT CHANGE UP (ref 0.5–1.3)
EGFR: 88 ML/MIN/1.73M2 — SIGNIFICANT CHANGE UP
EGFR: 88 ML/MIN/1.73M2 — SIGNIFICANT CHANGE UP
EGFR: 92 ML/MIN/1.73M2 — SIGNIFICANT CHANGE UP
EGFR: 92 ML/MIN/1.73M2 — SIGNIFICANT CHANGE UP
GLUCOSE SERPL-MCNC: 89 MG/DL — SIGNIFICANT CHANGE UP (ref 70–99)
GLUCOSE SERPL-MCNC: 96 MG/DL — SIGNIFICANT CHANGE UP (ref 70–99)
HCT VFR BLD CALC: 19.9 % — CRITICAL LOW (ref 34.5–45)
HCT VFR BLD CALC: 23.2 % — LOW (ref 34.5–45)
HGB BLD-MCNC: 6.2 G/DL — CRITICAL LOW (ref 11.5–15.5)
HGB BLD-MCNC: 7.5 G/DL — LOW (ref 11.5–15.5)
MAGNESIUM SERPL-MCNC: 2.1 MG/DL — SIGNIFICANT CHANGE UP (ref 1.6–2.6)
MCHC RBC-ENTMCNC: 30.5 PG — SIGNIFICANT CHANGE UP (ref 27–34)
MCHC RBC-ENTMCNC: 30.5 PG — SIGNIFICANT CHANGE UP (ref 27–34)
MCHC RBC-ENTMCNC: 31.2 G/DL — LOW (ref 32–36)
MCHC RBC-ENTMCNC: 32.3 G/DL — SIGNIFICANT CHANGE UP (ref 32–36)
MCV RBC AUTO: 94.3 FL — SIGNIFICANT CHANGE UP (ref 80–100)
MCV RBC AUTO: 98 FL — SIGNIFICANT CHANGE UP (ref 80–100)
NRBC # BLD AUTO: 0 K/UL — SIGNIFICANT CHANGE UP (ref 0–0)
NRBC # BLD AUTO: 0.02 K/UL — HIGH (ref 0–0)
NRBC # FLD: 0 K/UL — SIGNIFICANT CHANGE UP (ref 0–0)
NRBC # FLD: 0.02 K/UL — HIGH (ref 0–0)
NRBC BLD AUTO-RTO: 0 /100 WBCS — SIGNIFICANT CHANGE UP (ref 0–0)
NRBC BLD AUTO-RTO: 0 /100 WBCS — SIGNIFICANT CHANGE UP (ref 0–0)
PHOSPHATE SERPL-MCNC: 2.8 MG/DL — SIGNIFICANT CHANGE UP (ref 2.5–4.5)
PLATELET # BLD AUTO: 252 K/UL — SIGNIFICANT CHANGE UP (ref 150–400)
PLATELET # BLD AUTO: 255 K/UL — SIGNIFICANT CHANGE UP (ref 150–400)
PMV BLD: 9.2 FL — SIGNIFICANT CHANGE UP (ref 7–13)
PMV BLD: 9.9 FL — SIGNIFICANT CHANGE UP (ref 7–13)
POTASSIUM SERPL-MCNC: 3.3 MMOL/L — LOW (ref 3.5–5.3)
POTASSIUM SERPL-MCNC: 4.2 MMOL/L — SIGNIFICANT CHANGE UP (ref 3.5–5.3)
POTASSIUM SERPL-SCNC: 3.3 MMOL/L — LOW (ref 3.5–5.3)
POTASSIUM SERPL-SCNC: 4.2 MMOL/L — SIGNIFICANT CHANGE UP (ref 3.5–5.3)
PROT SERPL-MCNC: 6 G/DL — SIGNIFICANT CHANGE UP (ref 6–8.3)
RBC # BLD: 2.03 M/UL — LOW (ref 3.8–5.2)
RBC # BLD: 2.46 M/UL — LOW (ref 3.8–5.2)
RBC # FLD: 14.7 % — HIGH (ref 10.3–14.5)
RBC # FLD: 14.8 % — HIGH (ref 10.3–14.5)
SODIUM SERPL-SCNC: 136 MMOL/L — SIGNIFICANT CHANGE UP (ref 135–145)
SODIUM SERPL-SCNC: 139 MMOL/L — SIGNIFICANT CHANGE UP (ref 135–145)
WBC # BLD: 7.38 K/UL — SIGNIFICANT CHANGE UP (ref 3.8–10.5)
WBC # BLD: 8.37 K/UL — SIGNIFICANT CHANGE UP (ref 3.8–10.5)
WBC # FLD AUTO: 7.38 K/UL — SIGNIFICANT CHANGE UP (ref 3.8–10.5)
WBC # FLD AUTO: 8.37 K/UL — SIGNIFICANT CHANGE UP (ref 3.8–10.5)

## 2025-08-19 PROCEDURE — 93971 EXTREMITY STUDY: CPT | Mod: 26,LT

## 2025-08-19 PROCEDURE — 99232 SBSQ HOSP IP/OBS MODERATE 35: CPT | Mod: GC

## 2025-08-19 RX ORDER — APIXABAN 5 MG/1
5 TABLET, FILM COATED ORAL
Refills: 0 | Status: DISCONTINUED | OUTPATIENT
Start: 2025-08-19 | End: 2025-08-19

## 2025-08-19 RX ADMIN — Medication 100 MILLIEQUIVALENT(S): at 11:29

## 2025-08-19 RX ADMIN — BRIVARACETAM 75 MILLIGRAM(S): 75 TABLET, FILM COATED ORAL at 05:54

## 2025-08-19 RX ADMIN — Medication 650 MILLIGRAM(S): at 05:53

## 2025-08-19 RX ADMIN — Medication 650 MILLIGRAM(S): at 12:30

## 2025-08-19 RX ADMIN — BRIVARACETAM 75 MILLIGRAM(S): 75 TABLET, FILM COATED ORAL at 17:47

## 2025-08-19 RX ADMIN — Medication 2 PUFF(S): at 05:55

## 2025-08-19 RX ADMIN — Medication 2 PUFF(S): at 11:32

## 2025-08-19 RX ADMIN — GABAPENTIN 400 MILLIGRAM(S): 400 CAPSULE ORAL at 05:52

## 2025-08-19 RX ADMIN — Medication 650 MILLIGRAM(S): at 02:45

## 2025-08-19 RX ADMIN — POLYETHYLENE GLYCOL 3350 17 GRAM(S): 17 POWDER, FOR SOLUTION ORAL at 22:33

## 2025-08-19 RX ADMIN — Medication 650 MILLIGRAM(S): at 11:30

## 2025-08-19 RX ADMIN — GABAPENTIN 400 MILLIGRAM(S): 400 CAPSULE ORAL at 21:45

## 2025-08-19 RX ADMIN — Medication 2 PUFF(S): at 17:51

## 2025-08-19 RX ADMIN — GABAPENTIN 400 MILLIGRAM(S): 400 CAPSULE ORAL at 13:47

## 2025-08-19 RX ADMIN — Medication 1 TABLET(S): at 11:31

## 2025-08-19 RX ADMIN — Medication 650 MILLIGRAM(S): at 17:49

## 2025-08-19 RX ADMIN — Medication 20 MILLIGRAM(S): at 11:31

## 2025-08-19 RX ADMIN — Medication 650 MILLIGRAM(S): at 18:49

## 2025-08-19 RX ADMIN — Medication 100 MILLIEQUIVALENT(S): at 08:47

## 2025-08-19 RX ADMIN — ATORVASTATIN CALCIUM 40 MILLIGRAM(S): 80 TABLET, FILM COATED ORAL at 22:33

## 2025-08-19 RX ADMIN — Medication 125 MILLILITER(S): at 09:48

## 2025-08-19 RX ADMIN — Medication 650 MILLIGRAM(S): at 07:08

## 2025-08-19 RX ADMIN — Medication 100 MILLIEQUIVALENT(S): at 09:48

## 2025-08-19 RX ADMIN — Medication 40 MILLIEQUIVALENT(S): at 08:47

## 2025-08-19 RX ADMIN — Medication 4 MILLIGRAM(S): at 09:17

## 2025-08-19 RX ADMIN — Medication 2 TABLET(S): at 22:33

## 2025-08-19 RX ADMIN — Medication 325 MILLIGRAM(S): at 11:31

## 2025-08-19 RX ADMIN — Medication 0.25 MILLIGRAM(S): at 11:41

## 2025-08-19 RX ADMIN — Medication 650 MILLIGRAM(S): at 01:03

## 2025-08-19 RX ADMIN — Medication 1 APPLICATION(S): at 05:55

## 2025-08-19 RX ADMIN — Medication 4 MILLIGRAM(S): at 08:47

## 2025-08-19 RX ADMIN — LISINOPRIL 5 MILLIGRAM(S): 5 TABLET ORAL at 05:53

## 2025-08-19 RX ADMIN — ESCITALOPRAM OXALATE 10 MILLIGRAM(S): 20 TABLET ORAL at 11:30

## 2025-08-20 LAB
ANION GAP SERPL CALC-SCNC: 10 MMOL/L — SIGNIFICANT CHANGE UP (ref 7–14)
BLD GP AB SCN SERPL QL: NEGATIVE — SIGNIFICANT CHANGE UP
BUN SERPL-MCNC: 12 MG/DL — SIGNIFICANT CHANGE UP (ref 7–23)
CALCIUM SERPL-MCNC: 8 MG/DL — LOW (ref 8.4–10.5)
CHLORIDE SERPL-SCNC: 108 MMOL/L — HIGH (ref 98–107)
CO2 SERPL-SCNC: 18 MMOL/L — LOW (ref 22–31)
CREAT SERPL-MCNC: 0.63 MG/DL — SIGNIFICANT CHANGE UP (ref 0.5–1.3)
EGFR: 93 ML/MIN/1.73M2 — SIGNIFICANT CHANGE UP
EGFR: 93 ML/MIN/1.73M2 — SIGNIFICANT CHANGE UP
GLUCOSE SERPL-MCNC: 87 MG/DL — SIGNIFICANT CHANGE UP (ref 70–99)
HCT VFR BLD CALC: 20.9 % — CRITICAL LOW (ref 34.5–45)
HCT VFR BLD CALC: 21.4 % — LOW (ref 34.5–45)
HGB BLD-MCNC: 6.9 G/DL — CRITICAL LOW (ref 11.5–15.5)
HGB BLD-MCNC: 6.9 G/DL — CRITICAL LOW (ref 11.5–15.5)
MAGNESIUM SERPL-MCNC: 2 MG/DL — SIGNIFICANT CHANGE UP (ref 1.6–2.6)
MCHC RBC-ENTMCNC: 30.8 PG — SIGNIFICANT CHANGE UP (ref 27–34)
MCHC RBC-ENTMCNC: 31.1 PG — SIGNIFICANT CHANGE UP (ref 27–34)
MCHC RBC-ENTMCNC: 32.2 G/DL — SIGNIFICANT CHANGE UP (ref 32–36)
MCHC RBC-ENTMCNC: 33 G/DL — SIGNIFICANT CHANGE UP (ref 32–36)
MCV RBC AUTO: 93.3 FL — SIGNIFICANT CHANGE UP (ref 80–100)
MCV RBC AUTO: 96.4 FL — SIGNIFICANT CHANGE UP (ref 80–100)
NRBC # BLD AUTO: 0.02 K/UL — HIGH (ref 0–0)
NRBC # BLD AUTO: 0.03 K/UL — HIGH (ref 0–0)
NRBC # FLD: 0.02 K/UL — HIGH (ref 0–0)
NRBC # FLD: 0.03 K/UL — HIGH (ref 0–0)
NRBC BLD AUTO-RTO: 0 /100 WBCS — SIGNIFICANT CHANGE UP (ref 0–0)
NRBC BLD AUTO-RTO: 0 /100 WBCS — SIGNIFICANT CHANGE UP (ref 0–0)
PHOSPHATE SERPL-MCNC: 3 MG/DL — SIGNIFICANT CHANGE UP (ref 2.5–4.5)
PLATELET # BLD AUTO: 262 K/UL — SIGNIFICANT CHANGE UP (ref 150–400)
PLATELET # BLD AUTO: 274 K/UL — SIGNIFICANT CHANGE UP (ref 150–400)
PMV BLD: 9.1 FL — SIGNIFICANT CHANGE UP (ref 7–13)
PMV BLD: 9.3 FL — SIGNIFICANT CHANGE UP (ref 7–13)
POTASSIUM SERPL-MCNC: 3.7 MMOL/L — SIGNIFICANT CHANGE UP (ref 3.5–5.3)
POTASSIUM SERPL-SCNC: 3.7 MMOL/L — SIGNIFICANT CHANGE UP (ref 3.5–5.3)
RBC # BLD: 2.22 M/UL — LOW (ref 3.8–5.2)
RBC # BLD: 2.24 M/UL — LOW (ref 3.8–5.2)
RBC # FLD: 15.3 % — HIGH (ref 10.3–14.5)
RBC # FLD: 15.5 % — HIGH (ref 10.3–14.5)
RH IG SCN BLD-IMP: POSITIVE — SIGNIFICANT CHANGE UP
SODIUM SERPL-SCNC: 136 MMOL/L — SIGNIFICANT CHANGE UP (ref 135–145)
WBC # BLD: 6.66 K/UL — SIGNIFICANT CHANGE UP (ref 3.8–10.5)
WBC # BLD: 7.88 K/UL — SIGNIFICANT CHANGE UP (ref 3.8–10.5)
WBC # FLD AUTO: 6.66 K/UL — SIGNIFICANT CHANGE UP (ref 3.8–10.5)
WBC # FLD AUTO: 7.88 K/UL — SIGNIFICANT CHANGE UP (ref 3.8–10.5)

## 2025-08-20 PROCEDURE — 99232 SBSQ HOSP IP/OBS MODERATE 35: CPT | Mod: GC

## 2025-08-20 PROCEDURE — 73706 CT ANGIO LWR EXTR W/O&W/DYE: CPT | Mod: 26,LT,52

## 2025-08-20 RX ORDER — ENOXAPARIN SODIUM 100 MG/ML
40 INJECTION SUBCUTANEOUS EVERY 24 HOURS
Refills: 0 | Status: DISCONTINUED | OUTPATIENT
Start: 2025-08-20 | End: 2025-08-20

## 2025-08-20 RX ADMIN — Medication 1 APPLICATION(S): at 06:38

## 2025-08-20 RX ADMIN — GABAPENTIN 400 MILLIGRAM(S): 400 CAPSULE ORAL at 06:18

## 2025-08-20 RX ADMIN — Medication 650 MILLIGRAM(S): at 07:10

## 2025-08-20 RX ADMIN — Medication 2 PUFF(S): at 06:37

## 2025-08-20 RX ADMIN — Medication 650 MILLIGRAM(S): at 17:18

## 2025-08-20 RX ADMIN — Medication 2 TABLET(S): at 22:31

## 2025-08-20 RX ADMIN — Medication 2 PUFF(S): at 00:47

## 2025-08-20 RX ADMIN — GABAPENTIN 400 MILLIGRAM(S): 400 CAPSULE ORAL at 13:33

## 2025-08-20 RX ADMIN — Medication 325 MILLIGRAM(S): at 12:42

## 2025-08-20 RX ADMIN — GABAPENTIN 400 MILLIGRAM(S): 400 CAPSULE ORAL at 22:31

## 2025-08-20 RX ADMIN — Medication 2 PUFF(S): at 23:19

## 2025-08-20 RX ADMIN — METOPROLOL SUCCINATE 12.5 MILLIGRAM(S): 50 TABLET, EXTENDED RELEASE ORAL at 17:18

## 2025-08-20 RX ADMIN — Medication 20 MILLIGRAM(S): at 12:41

## 2025-08-20 RX ADMIN — LISINOPRIL 5 MILLIGRAM(S): 5 TABLET ORAL at 05:36

## 2025-08-20 RX ADMIN — Medication 0.25 MILLIGRAM(S): at 12:48

## 2025-08-20 RX ADMIN — ATORVASTATIN CALCIUM 40 MILLIGRAM(S): 80 TABLET, FILM COATED ORAL at 22:31

## 2025-08-20 RX ADMIN — Medication 4 MILLIGRAM(S): at 05:00

## 2025-08-20 RX ADMIN — Medication 650 MILLIGRAM(S): at 12:42

## 2025-08-20 RX ADMIN — Medication 2 PUFF(S): at 12:43

## 2025-08-20 RX ADMIN — OXYCODONE HYDROCHLORIDE 5 MILLIGRAM(S): 30 TABLET ORAL at 23:18

## 2025-08-20 RX ADMIN — Medication 650 MILLIGRAM(S): at 06:10

## 2025-08-20 RX ADMIN — Medication 650 MILLIGRAM(S): at 00:47

## 2025-08-20 RX ADMIN — Medication 4 MILLIGRAM(S): at 05:15

## 2025-08-20 RX ADMIN — Medication 650 MILLIGRAM(S): at 13:42

## 2025-08-20 RX ADMIN — ESCITALOPRAM OXALATE 10 MILLIGRAM(S): 20 TABLET ORAL at 12:41

## 2025-08-20 RX ADMIN — BRIVARACETAM 75 MILLIGRAM(S): 75 TABLET, FILM COATED ORAL at 05:36

## 2025-08-20 RX ADMIN — Medication 1 TABLET(S): at 12:44

## 2025-08-20 RX ADMIN — Medication 650 MILLIGRAM(S): at 01:30

## 2025-08-20 RX ADMIN — BRIVARACETAM 75 MILLIGRAM(S): 75 TABLET, FILM COATED ORAL at 17:24

## 2025-08-20 RX ADMIN — Medication 2 PUFF(S): at 17:16

## 2025-08-21 LAB
ANION GAP SERPL CALC-SCNC: 14 MMOL/L — SIGNIFICANT CHANGE UP (ref 7–14)
APTT BLD: 26.4 SEC — SIGNIFICANT CHANGE UP (ref 26.1–36.8)
BASOPHILS # BLD AUTO: 0.04 K/UL — SIGNIFICANT CHANGE UP (ref 0–0.2)
BASOPHILS # BLD AUTO: 0.06 K/UL — SIGNIFICANT CHANGE UP (ref 0–0.2)
BASOPHILS NFR BLD AUTO: 0.5 % — SIGNIFICANT CHANGE UP (ref 0–2)
BASOPHILS NFR BLD AUTO: 0.7 % — SIGNIFICANT CHANGE UP (ref 0–2)
BUN SERPL-MCNC: 12 MG/DL — SIGNIFICANT CHANGE UP (ref 7–23)
CALCIUM SERPL-MCNC: 8.7 MG/DL — SIGNIFICANT CHANGE UP (ref 8.4–10.5)
CHLORIDE SERPL-SCNC: 103 MMOL/L — SIGNIFICANT CHANGE UP (ref 98–107)
CK MB BLD-MCNC: 4.2 % — HIGH (ref 0–2.5)
CK MB BLD-MCNC: 4.4 % — HIGH (ref 0–2.5)
CK MB CFR SERPL CALC: 2.8 NG/ML — SIGNIFICANT CHANGE UP
CK MB CFR SERPL CALC: 2.9 NG/ML — SIGNIFICANT CHANGE UP
CK SERPL-CCNC: 63 U/L — SIGNIFICANT CHANGE UP (ref 25–170)
CK SERPL-CCNC: 69 U/L — SIGNIFICANT CHANGE UP (ref 25–170)
CO2 SERPL-SCNC: 19 MMOL/L — LOW (ref 22–31)
CREAT SERPL-MCNC: 0.74 MG/DL — SIGNIFICANT CHANGE UP (ref 0.5–1.3)
EGFR: 85 ML/MIN/1.73M2 — SIGNIFICANT CHANGE UP
EGFR: 85 ML/MIN/1.73M2 — SIGNIFICANT CHANGE UP
EOSINOPHIL # BLD AUTO: 0.16 K/UL — SIGNIFICANT CHANGE UP (ref 0–0.5)
EOSINOPHIL # BLD AUTO: 0.29 K/UL — SIGNIFICANT CHANGE UP (ref 0–0.5)
EOSINOPHIL NFR BLD AUTO: 1.8 % — SIGNIFICANT CHANGE UP (ref 0–6)
EOSINOPHIL NFR BLD AUTO: 3.5 % — SIGNIFICANT CHANGE UP (ref 0–6)
GLUCOSE SERPL-MCNC: 96 MG/DL — SIGNIFICANT CHANGE UP (ref 70–99)
HAPTOGLOB SERPL-MCNC: 311 MG/DL — HIGH (ref 34–200)
HCT VFR BLD CALC: 25.3 % — LOW (ref 34.5–45)
HCT VFR BLD CALC: 27.1 % — LOW (ref 34.5–45)
HCT VFR BLD CALC: 28 % — LOW (ref 34.5–45)
HGB BLD-MCNC: 8.3 G/DL — LOW (ref 11.5–15.5)
HGB BLD-MCNC: 8.8 G/DL — LOW (ref 11.5–15.5)
HGB BLD-MCNC: 9.2 G/DL — LOW (ref 11.5–15.5)
IMM GRANULOCYTES # BLD AUTO: 0.14 K/UL — HIGH (ref 0–0.07)
IMM GRANULOCYTES # BLD AUTO: 0.21 K/UL — HIGH (ref 0–0.07)
IMM GRANULOCYTES NFR BLD AUTO: 1.7 % — HIGH (ref 0–0.9)
IMM GRANULOCYTES NFR BLD AUTO: 2.3 % — HIGH (ref 0–0.9)
IMMATURE RETICULOCYTE FRACTION %: 33.2 % — SIGNIFICANT CHANGE UP
INR BLD: 1.14 RATIO — SIGNIFICANT CHANGE UP (ref 0.85–1.16)
LDH SERPL L TO P-CCNC: 231 U/L — HIGH (ref 135–225)
LYMPHOCYTES # BLD AUTO: 1.57 K/UL — SIGNIFICANT CHANGE UP (ref 1–3.3)
LYMPHOCYTES # BLD AUTO: 1.76 K/UL — SIGNIFICANT CHANGE UP (ref 1–3.3)
LYMPHOCYTES NFR BLD AUTO: 19.1 % — SIGNIFICANT CHANGE UP (ref 13–44)
LYMPHOCYTES NFR BLD AUTO: 19.3 % — SIGNIFICANT CHANGE UP (ref 13–44)
MAGNESIUM SERPL-MCNC: 1.9 MG/DL — SIGNIFICANT CHANGE UP (ref 1.6–2.6)
MCHC RBC-ENTMCNC: 29.6 PG — SIGNIFICANT CHANGE UP (ref 27–34)
MCHC RBC-ENTMCNC: 30.1 PG — SIGNIFICANT CHANGE UP (ref 27–34)
MCHC RBC-ENTMCNC: 30.4 PG — SIGNIFICANT CHANGE UP (ref 27–34)
MCHC RBC-ENTMCNC: 32.5 G/DL — SIGNIFICANT CHANGE UP (ref 32–36)
MCHC RBC-ENTMCNC: 32.8 G/DL — SIGNIFICANT CHANGE UP (ref 32–36)
MCHC RBC-ENTMCNC: 32.9 G/DL — SIGNIFICANT CHANGE UP (ref 32–36)
MCV RBC AUTO: 91.2 FL — SIGNIFICANT CHANGE UP (ref 80–100)
MCV RBC AUTO: 91.5 FL — SIGNIFICANT CHANGE UP (ref 80–100)
MCV RBC AUTO: 92.7 FL — SIGNIFICANT CHANGE UP (ref 80–100)
MONOCYTES # BLD AUTO: 0.59 K/UL — SIGNIFICANT CHANGE UP (ref 0–0.9)
MONOCYTES # BLD AUTO: 0.61 K/UL — SIGNIFICANT CHANGE UP (ref 0–0.9)
MONOCYTES NFR BLD AUTO: 6.5 % — SIGNIFICANT CHANGE UP (ref 2–14)
MONOCYTES NFR BLD AUTO: 7.4 % — SIGNIFICANT CHANGE UP (ref 2–14)
NEUTROPHILS # BLD AUTO: 5.59 K/UL — SIGNIFICANT CHANGE UP (ref 1.8–7.4)
NEUTROPHILS # BLD AUTO: 6.34 K/UL — SIGNIFICANT CHANGE UP (ref 1.8–7.4)
NEUTROPHILS NFR BLD AUTO: 67.8 % — SIGNIFICANT CHANGE UP (ref 43–77)
NEUTROPHILS NFR BLD AUTO: 69.4 % — SIGNIFICANT CHANGE UP (ref 43–77)
NRBC # BLD AUTO: 0.03 K/UL — HIGH (ref 0–0)
NRBC # BLD AUTO: 0.04 K/UL — HIGH (ref 0–0)
NRBC # BLD AUTO: 0.05 K/UL — HIGH (ref 0–0)
NRBC # FLD: 0.03 K/UL — HIGH (ref 0–0)
NRBC # FLD: 0.04 K/UL — HIGH (ref 0–0)
NRBC # FLD: 0.05 K/UL — HIGH (ref 0–0)
NRBC BLD AUTO-RTO: 0 /100 WBCS — SIGNIFICANT CHANGE UP (ref 0–0)
PHOSPHATE SERPL-MCNC: 3.1 MG/DL — SIGNIFICANT CHANGE UP (ref 2.5–4.5)
PLATELET # BLD AUTO: 280 K/UL — SIGNIFICANT CHANGE UP (ref 150–400)
PLATELET # BLD AUTO: 329 K/UL — SIGNIFICANT CHANGE UP (ref 150–400)
PLATELET # BLD AUTO: 334 K/UL — SIGNIFICANT CHANGE UP (ref 150–400)
PMV BLD: 8.9 FL — SIGNIFICANT CHANGE UP (ref 7–13)
PMV BLD: 9 FL — SIGNIFICANT CHANGE UP (ref 7–13)
PMV BLD: 9.1 FL — SIGNIFICANT CHANGE UP (ref 7–13)
POTASSIUM SERPL-MCNC: 3.8 MMOL/L — SIGNIFICANT CHANGE UP (ref 3.5–5.3)
POTASSIUM SERPL-SCNC: 3.8 MMOL/L — SIGNIFICANT CHANGE UP (ref 3.5–5.3)
PROTHROM AB SERPL-ACNC: 13.2 SEC — SIGNIFICANT CHANGE UP (ref 9.9–13.4)
RBC # BLD: 2.73 M/UL — LOW (ref 3.8–5.2)
RBC # BLD: 2.73 M/UL — LOW (ref 3.8–5.2)
RBC # BLD: 2.97 M/UL — LOW (ref 3.8–5.2)
RBC # BLD: 3.06 M/UL — LOW (ref 3.8–5.2)
RBC # FLD: 15.1 % — HIGH (ref 10.3–14.5)
RBC # FLD: 15.1 % — HIGH (ref 10.3–14.5)
RBC # FLD: 15.3 % — HIGH (ref 10.3–14.5)
RETICS #: 88.5 K/UL — SIGNIFICANT CHANGE UP (ref 25–125)
RETICS/RBC NFR: 3.2 % — HIGH (ref 0.5–2.5)
RETICULOCYTE HEMOGLOBIN EQUIVALENT: 29.6 PG — LOW (ref 30.6–40.7)
SODIUM SERPL-SCNC: 136 MMOL/L — SIGNIFICANT CHANGE UP (ref 135–145)
TROPONIN T, HIGH SENSITIVITY RESULT: 24 NG/L — HIGH
TROPONIN T, HIGH SENSITIVITY RESULT: 25 NG/L — HIGH
WBC # BLD: 8.24 K/UL — SIGNIFICANT CHANGE UP (ref 3.8–10.5)
WBC # BLD: 9.12 K/UL — SIGNIFICANT CHANGE UP (ref 3.8–10.5)
WBC # BLD: 9.56 K/UL — SIGNIFICANT CHANGE UP (ref 3.8–10.5)
WBC # FLD AUTO: 8.24 K/UL — SIGNIFICANT CHANGE UP (ref 3.8–10.5)
WBC # FLD AUTO: 9.12 K/UL — SIGNIFICANT CHANGE UP (ref 3.8–10.5)
WBC # FLD AUTO: 9.56 K/UL — SIGNIFICANT CHANGE UP (ref 3.8–10.5)

## 2025-08-21 PROCEDURE — 99233 SBSQ HOSP IP/OBS HIGH 50: CPT

## 2025-08-21 PROCEDURE — 71275 CT ANGIOGRAPHY CHEST: CPT | Mod: 26

## 2025-08-21 PROCEDURE — 71045 X-RAY EXAM CHEST 1 VIEW: CPT | Mod: 26

## 2025-08-21 RX ORDER — APIXABAN 5 MG/1
5 TABLET, FILM COATED ORAL
Refills: 0 | Status: DISCONTINUED | OUTPATIENT
Start: 2025-08-21 | End: 2025-08-22

## 2025-08-21 RX ADMIN — Medication 650 MILLIGRAM(S): at 17:10

## 2025-08-21 RX ADMIN — GABAPENTIN 400 MILLIGRAM(S): 400 CAPSULE ORAL at 15:05

## 2025-08-21 RX ADMIN — Medication 650 MILLIGRAM(S): at 12:34

## 2025-08-21 RX ADMIN — Medication 2 PUFF(S): at 06:39

## 2025-08-21 RX ADMIN — Medication 650 MILLIGRAM(S): at 07:33

## 2025-08-21 RX ADMIN — ESCITALOPRAM OXALATE 10 MILLIGRAM(S): 20 TABLET ORAL at 12:35

## 2025-08-21 RX ADMIN — OXYCODONE HYDROCHLORIDE 5 MILLIGRAM(S): 30 TABLET ORAL at 09:31

## 2025-08-21 RX ADMIN — Medication 1 APPLICATION(S): at 06:33

## 2025-08-21 RX ADMIN — BRIVARACETAM 75 MILLIGRAM(S): 75 TABLET, FILM COATED ORAL at 17:10

## 2025-08-21 RX ADMIN — GABAPENTIN 400 MILLIGRAM(S): 400 CAPSULE ORAL at 23:44

## 2025-08-21 RX ADMIN — POLYETHYLENE GLYCOL 3350 17 GRAM(S): 17 POWDER, FOR SOLUTION ORAL at 23:45

## 2025-08-21 RX ADMIN — Medication 650 MILLIGRAM(S): at 13:34

## 2025-08-21 RX ADMIN — OXYCODONE HYDROCHLORIDE 5 MILLIGRAM(S): 30 TABLET ORAL at 10:31

## 2025-08-21 RX ADMIN — Medication 650 MILLIGRAM(S): at 23:45

## 2025-08-21 RX ADMIN — APIXABAN 5 MILLIGRAM(S): 5 TABLET, FILM COATED ORAL at 17:56

## 2025-08-21 RX ADMIN — Medication 1 TABLET(S): at 12:35

## 2025-08-21 RX ADMIN — Medication 2 TABLET(S): at 23:45

## 2025-08-21 RX ADMIN — Medication 650 MILLIGRAM(S): at 06:33

## 2025-08-21 RX ADMIN — Medication 0.25 MILLIGRAM(S): at 11:51

## 2025-08-21 RX ADMIN — LISINOPRIL 5 MILLIGRAM(S): 5 TABLET ORAL at 06:33

## 2025-08-21 RX ADMIN — Medication 20 MILLIGRAM(S): at 12:34

## 2025-08-21 RX ADMIN — Medication 2 PUFF(S): at 17:22

## 2025-08-21 RX ADMIN — Medication 325 MILLIGRAM(S): at 12:34

## 2025-08-21 RX ADMIN — METOPROLOL SUCCINATE 12.5 MILLIGRAM(S): 50 TABLET, EXTENDED RELEASE ORAL at 06:33

## 2025-08-21 RX ADMIN — GABAPENTIN 400 MILLIGRAM(S): 400 CAPSULE ORAL at 06:33

## 2025-08-21 RX ADMIN — ATORVASTATIN CALCIUM 40 MILLIGRAM(S): 80 TABLET, FILM COATED ORAL at 23:44

## 2025-08-21 RX ADMIN — METOPROLOL SUCCINATE 12.5 MILLIGRAM(S): 50 TABLET, EXTENDED RELEASE ORAL at 17:20

## 2025-08-21 RX ADMIN — Medication 650 MILLIGRAM(S): at 19:03

## 2025-08-21 RX ADMIN — Medication 2 PUFF(S): at 12:34

## 2025-08-21 RX ADMIN — BRIVARACETAM 75 MILLIGRAM(S): 75 TABLET, FILM COATED ORAL at 06:33

## 2025-08-22 ENCOUNTER — TRANSCRIPTION ENCOUNTER (OUTPATIENT)
Age: 75
End: 2025-08-22

## 2025-08-22 VITALS
OXYGEN SATURATION: 98 % | TEMPERATURE: 98 F | HEART RATE: 60 BPM | SYSTOLIC BLOOD PRESSURE: 116 MMHG | DIASTOLIC BLOOD PRESSURE: 45 MMHG | RESPIRATION RATE: 18 BRPM

## 2025-08-22 LAB
ANION GAP SERPL CALC-SCNC: 12 MMOL/L — SIGNIFICANT CHANGE UP (ref 7–14)
BUN SERPL-MCNC: 11 MG/DL — SIGNIFICANT CHANGE UP (ref 7–23)
CALCIUM SERPL-MCNC: 8 MG/DL — LOW (ref 8.4–10.5)
CHLORIDE SERPL-SCNC: 104 MMOL/L — SIGNIFICANT CHANGE UP (ref 98–107)
CO2 SERPL-SCNC: 19 MMOL/L — LOW (ref 22–31)
CREAT SERPL-MCNC: 0.65 MG/DL — SIGNIFICANT CHANGE UP (ref 0.5–1.3)
EGFR: 92 ML/MIN/1.73M2 — SIGNIFICANT CHANGE UP
EGFR: 92 ML/MIN/1.73M2 — SIGNIFICANT CHANGE UP
GLUCOSE SERPL-MCNC: 113 MG/DL — HIGH (ref 70–99)
HCT VFR BLD CALC: 25.5 % — LOW (ref 34.5–45)
HGB BLD-MCNC: 8.3 G/DL — LOW (ref 11.5–15.5)
MAGNESIUM SERPL-MCNC: 1.9 MG/DL — SIGNIFICANT CHANGE UP (ref 1.6–2.6)
MCHC RBC-ENTMCNC: 31.4 PG — SIGNIFICANT CHANGE UP (ref 27–34)
MCHC RBC-ENTMCNC: 32.5 G/DL — SIGNIFICANT CHANGE UP (ref 32–36)
MCV RBC AUTO: 96.6 FL — SIGNIFICANT CHANGE UP (ref 80–100)
NRBC # BLD AUTO: 0.02 K/UL — HIGH (ref 0–0)
NRBC # FLD: 0.02 K/UL — HIGH (ref 0–0)
NRBC BLD AUTO-RTO: 0 /100 WBCS — SIGNIFICANT CHANGE UP (ref 0–0)
PHOSPHATE SERPL-MCNC: 3.8 MG/DL — SIGNIFICANT CHANGE UP (ref 2.5–4.5)
PLATELET # BLD AUTO: 337 K/UL — SIGNIFICANT CHANGE UP (ref 150–400)
PMV BLD: 9.2 FL — SIGNIFICANT CHANGE UP (ref 7–13)
POTASSIUM SERPL-MCNC: 3.9 MMOL/L — SIGNIFICANT CHANGE UP (ref 3.5–5.3)
POTASSIUM SERPL-SCNC: 3.9 MMOL/L — SIGNIFICANT CHANGE UP (ref 3.5–5.3)
RBC # BLD: 2.64 M/UL — LOW (ref 3.8–5.2)
RBC # FLD: 15.6 % — HIGH (ref 10.3–14.5)
SODIUM SERPL-SCNC: 135 MMOL/L — SIGNIFICANT CHANGE UP (ref 135–145)
WBC # BLD: 6.83 K/UL — SIGNIFICANT CHANGE UP (ref 3.8–10.5)
WBC # FLD AUTO: 6.83 K/UL — SIGNIFICANT CHANGE UP (ref 3.8–10.5)

## 2025-08-22 PROCEDURE — 99239 HOSP IP/OBS DSCHRG MGMT >30: CPT

## 2025-08-22 RX ORDER — OXYCODONE HYDROCHLORIDE 30 MG/1
1 TABLET ORAL
Qty: 0 | Refills: 0 | DISCHARGE
Start: 2025-08-22

## 2025-08-22 RX ORDER — OXYCODONE HYDROCHLORIDE 30 MG/1
10 TABLET ORAL EVERY 6 HOURS
Refills: 0 | Status: DISCONTINUED | OUTPATIENT
Start: 2025-08-22 | End: 2025-08-22

## 2025-08-22 RX ADMIN — Medication 1 TABLET(S): at 13:23

## 2025-08-22 RX ADMIN — GABAPENTIN 400 MILLIGRAM(S): 400 CAPSULE ORAL at 05:35

## 2025-08-22 RX ADMIN — Medication 325 MILLIGRAM(S): at 13:23

## 2025-08-22 RX ADMIN — Medication 4 MILLIGRAM(S): at 08:21

## 2025-08-22 RX ADMIN — Medication 2 PUFF(S): at 13:28

## 2025-08-22 RX ADMIN — LISINOPRIL 5 MILLIGRAM(S): 5 TABLET ORAL at 05:35

## 2025-08-22 RX ADMIN — Medication 650 MILLIGRAM(S): at 05:35

## 2025-08-22 RX ADMIN — Medication 0.25 MILLIGRAM(S): at 13:26

## 2025-08-22 RX ADMIN — Medication 650 MILLIGRAM(S): at 13:22

## 2025-08-22 RX ADMIN — Medication 20 MILLIGRAM(S): at 13:23

## 2025-08-22 RX ADMIN — BRIVARACETAM 75 MILLIGRAM(S): 75 TABLET, FILM COATED ORAL at 05:35

## 2025-08-22 RX ADMIN — Medication 2 PUFF(S): at 05:36

## 2025-08-22 RX ADMIN — Medication 4 MILLIGRAM(S): at 09:21

## 2025-08-22 RX ADMIN — GABAPENTIN 400 MILLIGRAM(S): 400 CAPSULE ORAL at 13:22

## 2025-08-22 RX ADMIN — OXYCODONE HYDROCHLORIDE 10 MILLIGRAM(S): 30 TABLET ORAL at 17:04

## 2025-08-22 RX ADMIN — METOPROLOL SUCCINATE 12.5 MILLIGRAM(S): 50 TABLET, EXTENDED RELEASE ORAL at 05:35

## 2025-08-22 RX ADMIN — ESCITALOPRAM OXALATE 10 MILLIGRAM(S): 20 TABLET ORAL at 13:24

## 2025-08-22 RX ADMIN — APIXABAN 5 MILLIGRAM(S): 5 TABLET, FILM COATED ORAL at 05:35

## 2025-09-09 PROBLEM — M25.552 LEFT HIP PAIN: Status: ACTIVE | Noted: 2025-09-09

## 2025-09-11 ENCOUNTER — APPOINTMENT (OUTPATIENT)
Dept: ORTHOPEDIC SURGERY | Facility: CLINIC | Age: 75
End: 2025-09-11

## 2025-09-11 DIAGNOSIS — M25.552 PAIN IN LEFT HIP: ICD-10-CM
